# Patient Record
Sex: FEMALE | Race: WHITE | URBAN - METROPOLITAN AREA
[De-identification: names, ages, dates, MRNs, and addresses within clinical notes are randomized per-mention and may not be internally consistent; named-entity substitution may affect disease eponyms.]

---

## 2017-02-08 ENCOUNTER — ANESTHESIA EVENT (OUTPATIENT)
Dept: SURGERY | Facility: CLINIC | Age: 42
End: 2017-02-08
Payer: COMMERCIAL

## 2017-02-08 ENCOUNTER — APPOINTMENT (OUTPATIENT)
Dept: CT IMAGING | Facility: CLINIC | Age: 42
End: 2017-02-08
Attending: EMERGENCY MEDICINE
Payer: COMMERCIAL

## 2017-02-08 ENCOUNTER — ANESTHESIA (OUTPATIENT)
Dept: SURGERY | Facility: CLINIC | Age: 42
End: 2017-02-08
Payer: COMMERCIAL

## 2017-02-08 ENCOUNTER — HOSPITAL ENCOUNTER (OUTPATIENT)
Facility: CLINIC | Age: 42
Discharge: HOME | End: 2017-02-09
Attending: EMERGENCY MEDICINE | Admitting: SURGERY
Payer: COMMERCIAL

## 2017-02-08 DIAGNOSIS — K35.891 ACUTE GANGRENOUS APPENDICITIS: Primary | ICD-10-CM

## 2017-02-08 DIAGNOSIS — K35.30 ACUTE APPENDICITIS WITH LOCALIZED PERITONITIS: ICD-10-CM

## 2017-02-08 LAB
ALBUMIN SERPL-MCNC: 3.8 G/DL (ref 3.4–5)
ALBUMIN UR-MCNC: NEGATIVE MG/DL
ALP SERPL-CCNC: 63 U/L (ref 40–150)
ALT SERPL W P-5'-P-CCNC: 16 U/L (ref 0–50)
ANION GAP SERPL CALCULATED.3IONS-SCNC: 9 MMOL/L (ref 3–14)
APPEARANCE UR: CLEAR
AST SERPL W P-5'-P-CCNC: 13 U/L (ref 0–45)
BASOPHILS # BLD AUTO: 0 10E9/L (ref 0–0.2)
BASOPHILS NFR BLD AUTO: 0.1 %
BILIRUB SERPL-MCNC: 2.1 MG/DL (ref 0.2–1.3)
BILIRUB UR QL STRIP: NEGATIVE
BUN SERPL-MCNC: 12 MG/DL (ref 7–30)
CALCIUM SERPL-MCNC: 8.7 MG/DL (ref 8.5–10.1)
CHLORIDE SERPL-SCNC: 105 MMOL/L (ref 94–109)
CO2 SERPL-SCNC: 26 MMOL/L (ref 20–32)
COLOR UR AUTO: YELLOW
CREAT SERPL-MCNC: 0.63 MG/DL (ref 0.52–1.04)
DIFFERENTIAL METHOD BLD: ABNORMAL
EOSINOPHIL # BLD AUTO: 0 10E9/L (ref 0–0.7)
EOSINOPHIL NFR BLD AUTO: 0 %
ERYTHROCYTE [DISTWIDTH] IN BLOOD BY AUTOMATED COUNT: 12.5 % (ref 10–15)
GFR SERPL CREATININE-BSD FRML MDRD: ABNORMAL ML/MIN/1.7M2
GLUCOSE SERPL-MCNC: 97 MG/DL (ref 70–99)
GLUCOSE UR STRIP-MCNC: NEGATIVE MG/DL
HCG SERPL QL: NEGATIVE
HCT VFR BLD AUTO: 43.2 % (ref 35–47)
HGB BLD-MCNC: 14 G/DL (ref 11.7–15.7)
HGB UR QL STRIP: ABNORMAL
IMM GRANULOCYTES # BLD: 0.1 10E9/L (ref 0–0.4)
IMM GRANULOCYTES NFR BLD: 0.5 %
KETONES UR STRIP-MCNC: 80 MG/DL
LEUKOCYTE ESTERASE UR QL STRIP: NEGATIVE
LYMPHOCYTES # BLD AUTO: 0.8 10E9/L (ref 0.8–5.3)
LYMPHOCYTES NFR BLD AUTO: 5.4 %
MCH RBC QN AUTO: 28.6 PG (ref 26.5–33)
MCHC RBC AUTO-ENTMCNC: 32.4 G/DL (ref 31.5–36.5)
MCV RBC AUTO: 88 FL (ref 78–100)
MONOCYTES # BLD AUTO: 0.8 10E9/L (ref 0–1.3)
MONOCYTES NFR BLD AUTO: 5.3 %
MUCOUS THREADS #/AREA URNS LPF: PRESENT /LPF
NEUTROPHILS # BLD AUTO: 13.4 10E9/L (ref 1.6–8.3)
NEUTROPHILS NFR BLD AUTO: 88.7 %
NITRATE UR QL: NEGATIVE
NRBC # BLD AUTO: 0 10*3/UL
NRBC BLD AUTO-RTO: 0 /100
PH UR STRIP: 7 PH (ref 5–7)
PLATELET # BLD AUTO: 174 10E9/L (ref 150–450)
POTASSIUM SERPL-SCNC: 3.8 MMOL/L (ref 3.4–5.3)
PROT SERPL-MCNC: 7.6 G/DL (ref 6.8–8.8)
RBC # BLD AUTO: 4.9 10E12/L (ref 3.8–5.2)
RBC #/AREA URNS AUTO: >182 /HPF (ref 0–2)
SODIUM SERPL-SCNC: 140 MMOL/L (ref 133–144)
SP GR UR STRIP: 1.05 (ref 1–1.03)
SQUAMOUS #/AREA URNS AUTO: 1 /HPF (ref 0–1)
URN SPEC COLLECT METH UR: ABNORMAL
UROBILINOGEN UR STRIP-MCNC: NORMAL MG/DL (ref 0–2)
WBC # BLD AUTO: 15.1 10E9/L (ref 4–11)
WBC #/AREA URNS AUTO: 0 /HPF (ref 0–2)

## 2017-02-08 PROCEDURE — 96361 HYDRATE IV INFUSION ADD-ON: CPT

## 2017-02-08 PROCEDURE — 44970 LAPAROSCOPY APPENDECTOMY: CPT | Performed by: SURGERY

## 2017-02-08 PROCEDURE — 37000009 ZZH ANESTHESIA TECHNICAL FEE, EACH ADDTL 15 MIN: Performed by: SURGERY

## 2017-02-08 PROCEDURE — 99204 OFFICE O/P NEW MOD 45 MIN: CPT | Mod: 57 | Performed by: SURGERY

## 2017-02-08 PROCEDURE — 25000125 ZZHC RX 250: Performed by: NURSE ANESTHETIST, CERTIFIED REGISTERED

## 2017-02-08 PROCEDURE — 25500064 ZZH RX 255 OP 636: Performed by: EMERGENCY MEDICINE

## 2017-02-08 PROCEDURE — 71000013 ZZH RECOVERY PHASE 1 LEVEL 1 EA ADDTL HR: Performed by: SURGERY

## 2017-02-08 PROCEDURE — 84703 CHORIONIC GONADOTROPIN ASSAY: CPT | Performed by: EMERGENCY MEDICINE

## 2017-02-08 PROCEDURE — 25000128 H RX IP 250 OP 636: Performed by: EMERGENCY MEDICINE

## 2017-02-08 PROCEDURE — 81001 URINALYSIS AUTO W/SCOPE: CPT | Performed by: EMERGENCY MEDICINE

## 2017-02-08 PROCEDURE — 25000566 ZZH SEVOFLURANE, EA 15 MIN: Performed by: SURGERY

## 2017-02-08 PROCEDURE — 96376 TX/PRO/DX INJ SAME DRUG ADON: CPT

## 2017-02-08 PROCEDURE — 71000012 ZZH RECOVERY PHASE 1 LEVEL 1 FIRST HR: Performed by: SURGERY

## 2017-02-08 PROCEDURE — 80053 COMPREHEN METABOLIC PANEL: CPT | Performed by: EMERGENCY MEDICINE

## 2017-02-08 PROCEDURE — 25800025 ZZH RX 258: Performed by: ANESTHESIOLOGY

## 2017-02-08 PROCEDURE — 88304 TISSUE EXAM BY PATHOLOGIST: CPT | Performed by: SURGERY

## 2017-02-08 PROCEDURE — 25000125 ZZHC RX 250: Performed by: ANESTHESIOLOGY

## 2017-02-08 PROCEDURE — 25000128 H RX IP 250 OP 636: Performed by: NURSE ANESTHETIST, CERTIFIED REGISTERED

## 2017-02-08 PROCEDURE — 40000306 ZZH STATISTIC PRE PROC ASSESS II: Performed by: SURGERY

## 2017-02-08 PROCEDURE — 37000008 ZZH ANESTHESIA TECHNICAL FEE, 1ST 30 MIN: Performed by: SURGERY

## 2017-02-08 PROCEDURE — 99285 EMERGENCY DEPT VISIT HI MDM: CPT | Mod: 25

## 2017-02-08 PROCEDURE — 27210794 ZZH OR GENERAL SUPPLY STERILE: Performed by: SURGERY

## 2017-02-08 PROCEDURE — 36000058 ZZH SURGERY LEVEL 3 EA 15 ADDTL MIN: Performed by: SURGERY

## 2017-02-08 PROCEDURE — 25800025 ZZH RX 258: Performed by: SURGERY

## 2017-02-08 PROCEDURE — 88304 TISSUE EXAM BY PATHOLOGIST: CPT | Mod: 26 | Performed by: SURGERY

## 2017-02-08 PROCEDURE — 96374 THER/PROPH/DIAG INJ IV PUSH: CPT | Mod: 59

## 2017-02-08 PROCEDURE — 96375 TX/PRO/DX INJ NEW DRUG ADDON: CPT

## 2017-02-08 PROCEDURE — 85025 COMPLETE CBC W/AUTO DIFF WBC: CPT | Performed by: EMERGENCY MEDICINE

## 2017-02-08 PROCEDURE — 36000056 ZZH SURGERY LEVEL 3 1ST 30 MIN: Performed by: SURGERY

## 2017-02-08 PROCEDURE — 74177 CT ABD & PELVIS W/CONTRAST: CPT

## 2017-02-08 PROCEDURE — 25000125 ZZHC RX 250: Performed by: SURGERY

## 2017-02-08 RX ORDER — ONDANSETRON 2 MG/ML
4 INJECTION INTRAMUSCULAR; INTRAVENOUS EVERY 30 MIN PRN
Status: DISCONTINUED | OUTPATIENT
Start: 2017-02-08 | End: 2017-02-09 | Stop reason: HOSPADM

## 2017-02-08 RX ORDER — MEPERIDINE HYDROCHLORIDE 25 MG/ML
12.5 INJECTION INTRAMUSCULAR; INTRAVENOUS; SUBCUTANEOUS
Status: DISCONTINUED | OUTPATIENT
Start: 2017-02-08 | End: 2017-02-09 | Stop reason: HOSPADM

## 2017-02-08 RX ORDER — HYDROMORPHONE HYDROCHLORIDE 1 MG/ML
0.5 INJECTION, SOLUTION INTRAMUSCULAR; INTRAVENOUS; SUBCUTANEOUS
Status: DISCONTINUED | OUTPATIENT
Start: 2017-02-08 | End: 2017-02-09

## 2017-02-08 RX ORDER — ONDANSETRON 2 MG/ML
INJECTION INTRAMUSCULAR; INTRAVENOUS
Status: DISCONTINUED
Start: 2017-02-08 | End: 2017-02-08 | Stop reason: HOSPADM

## 2017-02-08 RX ORDER — MORPHINE SULFATE 4 MG/ML
INJECTION, SOLUTION INTRAMUSCULAR; INTRAVENOUS
Status: DISCONTINUED
Start: 2017-02-08 | End: 2017-02-08 | Stop reason: HOSPADM

## 2017-02-08 RX ORDER — NALOXONE HYDROCHLORIDE 0.4 MG/ML
.1-.4 INJECTION, SOLUTION INTRAMUSCULAR; INTRAVENOUS; SUBCUTANEOUS
Status: DISCONTINUED | OUTPATIENT
Start: 2017-02-08 | End: 2017-02-09 | Stop reason: HOSPADM

## 2017-02-08 RX ORDER — ONDANSETRON 2 MG/ML
INJECTION INTRAMUSCULAR; INTRAVENOUS PRN
Status: DISCONTINUED | OUTPATIENT
Start: 2017-02-08 | End: 2017-02-08

## 2017-02-08 RX ORDER — NEOSTIGMINE METHYLSULFATE 1 MG/ML
VIAL (ML) INJECTION PRN
Status: DISCONTINUED | OUTPATIENT
Start: 2017-02-08 | End: 2017-02-08

## 2017-02-08 RX ORDER — DEXAMETHASONE SODIUM PHOSPHATE 4 MG/ML
INJECTION, SOLUTION INTRA-ARTICULAR; INTRALESIONAL; INTRAMUSCULAR; INTRAVENOUS; SOFT TISSUE PRN
Status: DISCONTINUED | OUTPATIENT
Start: 2017-02-08 | End: 2017-02-08

## 2017-02-08 RX ORDER — GLYCOPYRROLATE 0.2 MG/ML
INJECTION, SOLUTION INTRAMUSCULAR; INTRAVENOUS PRN
Status: DISCONTINUED | OUTPATIENT
Start: 2017-02-08 | End: 2017-02-08

## 2017-02-08 RX ORDER — LIDOCAINE 40 MG/G
CREAM TOPICAL
Status: DISCONTINUED | OUTPATIENT
Start: 2017-02-08 | End: 2017-02-08 | Stop reason: HOSPADM

## 2017-02-08 RX ORDER — FENTANYL CITRATE 50 UG/ML
25-50 INJECTION, SOLUTION INTRAMUSCULAR; INTRAVENOUS
Status: DISCONTINUED | OUTPATIENT
Start: 2017-02-08 | End: 2017-02-09 | Stop reason: HOSPADM

## 2017-02-08 RX ORDER — SODIUM CHLORIDE, SODIUM LACTATE, POTASSIUM CHLORIDE, CALCIUM CHLORIDE 600; 310; 30; 20 MG/100ML; MG/100ML; MG/100ML; MG/100ML
INJECTION, SOLUTION INTRAVENOUS CONTINUOUS
Status: DISCONTINUED | OUTPATIENT
Start: 2017-02-08 | End: 2017-02-08 | Stop reason: HOSPADM

## 2017-02-08 RX ORDER — LIDOCAINE 40 MG/G
CREAM TOPICAL
Status: DISCONTINUED | OUTPATIENT
Start: 2017-02-08 | End: 2017-02-09 | Stop reason: HOSPADM

## 2017-02-08 RX ORDER — MORPHINE SULFATE 4 MG/ML
4 INJECTION, SOLUTION INTRAMUSCULAR; INTRAVENOUS ONCE
Status: COMPLETED | OUTPATIENT
Start: 2017-02-08 | End: 2017-02-08

## 2017-02-08 RX ORDER — FENTANYL CITRATE 50 UG/ML
INJECTION, SOLUTION INTRAMUSCULAR; INTRAVENOUS PRN
Status: DISCONTINUED | OUTPATIENT
Start: 2017-02-08 | End: 2017-02-08

## 2017-02-08 RX ORDER — ONDANSETRON 4 MG/1
4 TABLET, ORALLY DISINTEGRATING ORAL EVERY 30 MIN PRN
Status: DISCONTINUED | OUTPATIENT
Start: 2017-02-08 | End: 2017-02-09 | Stop reason: HOSPADM

## 2017-02-08 RX ORDER — CEFOXITIN 2 G/1
2 INJECTION, POWDER, FOR SOLUTION INTRAVENOUS EVERY 6 HOURS
Status: DISCONTINUED | OUTPATIENT
Start: 2017-02-08 | End: 2017-02-08

## 2017-02-08 RX ORDER — ALBUTEROL SULFATE 0.83 MG/ML
2.5 SOLUTION RESPIRATORY (INHALATION) EVERY 4 HOURS PRN
Status: DISCONTINUED | OUTPATIENT
Start: 2017-02-08 | End: 2017-02-09 | Stop reason: HOSPADM

## 2017-02-08 RX ORDER — PROPOFOL 10 MG/ML
INJECTION, EMULSION INTRAVENOUS PRN
Status: DISCONTINUED | OUTPATIENT
Start: 2017-02-08 | End: 2017-02-08

## 2017-02-08 RX ORDER — ONDANSETRON 2 MG/ML
4 INJECTION INTRAMUSCULAR; INTRAVENOUS ONCE
Status: COMPLETED | OUTPATIENT
Start: 2017-02-08 | End: 2017-02-08

## 2017-02-08 RX ORDER — BUPIVACAINE HYDROCHLORIDE 2.5 MG/ML
INJECTION, SOLUTION EPIDURAL; INFILTRATION; INTRACAUDAL PRN
Status: DISCONTINUED | OUTPATIENT
Start: 2017-02-08 | End: 2017-02-09 | Stop reason: HOSPADM

## 2017-02-08 RX ORDER — HYDROMORPHONE HYDROCHLORIDE 1 MG/ML
.3-.5 INJECTION, SOLUTION INTRAMUSCULAR; INTRAVENOUS; SUBCUTANEOUS EVERY 10 MIN PRN
Status: DISCONTINUED | OUTPATIENT
Start: 2017-02-08 | End: 2017-02-09 | Stop reason: HOSPADM

## 2017-02-08 RX ORDER — PROMETHAZINE HYDROCHLORIDE 25 MG/ML
6.25 INJECTION, SOLUTION INTRAMUSCULAR; INTRAVENOUS
Status: DISCONTINUED | OUTPATIENT
Start: 2017-02-08 | End: 2017-02-09 | Stop reason: HOSPADM

## 2017-02-08 RX ORDER — CEFOXITIN 2 G/1
2 INJECTION, POWDER, FOR SOLUTION INTRAVENOUS ONCE
Status: COMPLETED | OUTPATIENT
Start: 2017-02-08 | End: 2017-02-08

## 2017-02-08 RX ORDER — LIDOCAINE HYDROCHLORIDE 10 MG/ML
INJECTION, SOLUTION INFILTRATION; PERINEURAL PRN
Status: DISCONTINUED | OUTPATIENT
Start: 2017-02-08 | End: 2017-02-08

## 2017-02-08 RX ORDER — IOPAMIDOL 755 MG/ML
500 INJECTION, SOLUTION INTRAVASCULAR ONCE
Status: COMPLETED | OUTPATIENT
Start: 2017-02-08 | End: 2017-02-08

## 2017-02-08 RX ORDER — SODIUM CHLORIDE, SODIUM LACTATE, POTASSIUM CHLORIDE, CALCIUM CHLORIDE 600; 310; 30; 20 MG/100ML; MG/100ML; MG/100ML; MG/100ML
1000 INJECTION, SOLUTION INTRAVENOUS CONTINUOUS
Status: DISCONTINUED | OUTPATIENT
Start: 2017-02-08 | End: 2017-02-09 | Stop reason: HOSPADM

## 2017-02-08 RX ORDER — HYDROCODONE BITARTRATE AND ACETAMINOPHEN 5; 325 MG/1; MG/1
1-2 TABLET ORAL EVERY 4 HOURS PRN
Qty: 30 TABLET | Refills: 0 | Status: SHIPPED | OUTPATIENT
Start: 2017-02-08 | End: 2017-06-21

## 2017-02-08 RX ORDER — SODIUM CHLORIDE, SODIUM LACTATE, POTASSIUM CHLORIDE, CALCIUM CHLORIDE 600; 310; 30; 20 MG/100ML; MG/100ML; MG/100ML; MG/100ML
INJECTION, SOLUTION INTRAVENOUS CONTINUOUS
Status: DISCONTINUED | OUTPATIENT
Start: 2017-02-08 | End: 2017-02-09

## 2017-02-08 RX ADMIN — SODIUM CHLORIDE, POTASSIUM CHLORIDE, SODIUM LACTATE AND CALCIUM CHLORIDE: 600; 310; 30; 20 INJECTION, SOLUTION INTRAVENOUS at 22:28

## 2017-02-08 RX ADMIN — FENTANYL CITRATE 50 MCG: 50 INJECTION, SOLUTION INTRAMUSCULAR; INTRAVENOUS at 22:43

## 2017-02-08 RX ADMIN — FENTANYL CITRATE 50 MCG: 50 INJECTION, SOLUTION INTRAMUSCULAR; INTRAVENOUS at 22:58

## 2017-02-08 RX ADMIN — HYDROMORPHONE HYDROCHLORIDE 0.5 MG: 10 INJECTION, SOLUTION INTRAMUSCULAR; INTRAVENOUS; SUBCUTANEOUS at 19:53

## 2017-02-08 RX ADMIN — DEXAMETHASONE SODIUM PHOSPHATE 4 MG: 4 INJECTION, SOLUTION INTRAMUSCULAR; INTRAVENOUS at 22:43

## 2017-02-08 RX ADMIN — FENTANYL CITRATE 50 MCG: 50 INJECTION INTRAMUSCULAR; INTRAVENOUS at 23:49

## 2017-02-08 RX ADMIN — GLYCOPYRROLATE 0.2 MG: 0.2 INJECTION, SOLUTION INTRAMUSCULAR; INTRAVENOUS at 22:43

## 2017-02-08 RX ADMIN — SODIUM CHLORIDE 1000 ML: 9 INJECTION, SOLUTION INTRAVENOUS at 16:18

## 2017-02-08 RX ADMIN — Medication 3 MG: at 23:13

## 2017-02-08 RX ADMIN — MIDAZOLAM HYDROCHLORIDE 1 MG: 1 INJECTION, SOLUTION INTRAMUSCULAR; INTRAVENOUS at 22:49

## 2017-02-08 RX ADMIN — SODIUM CHLORIDE 63 ML: 9 INJECTION, SOLUTION INTRAVENOUS at 16:41

## 2017-02-08 RX ADMIN — FENTANYL CITRATE 100 MCG: 50 INJECTION, SOLUTION INTRAMUSCULAR; INTRAVENOUS at 22:41

## 2017-02-08 RX ADMIN — MORPHINE SULFATE 4 MG: 4 INJECTION, SOLUTION INTRAMUSCULAR; INTRAVENOUS at 16:23

## 2017-02-08 RX ADMIN — PROPOFOL 150 MG: 10 INJECTION, EMULSION INTRAVENOUS at 22:43

## 2017-02-08 RX ADMIN — MIDAZOLAM HYDROCHLORIDE 1 MG: 1 INJECTION, SOLUTION INTRAMUSCULAR; INTRAVENOUS at 22:37

## 2017-02-08 RX ADMIN — ONDANSETRON 4 MG: 2 INJECTION INTRAMUSCULAR; INTRAVENOUS at 22:58

## 2017-02-08 RX ADMIN — ROCURONIUM BROMIDE 30 MG: 10 INJECTION INTRAVENOUS at 22:43

## 2017-02-08 RX ADMIN — SODIUM CHLORIDE, POTASSIUM CHLORIDE, SODIUM LACTATE AND CALCIUM CHLORIDE: 600; 310; 30; 20 INJECTION, SOLUTION INTRAVENOUS at 23:13

## 2017-02-08 RX ADMIN — CEFOXITIN SODIUM 2 G: 2 POWDER, FOR SOLUTION INTRAVENOUS at 18:04

## 2017-02-08 RX ADMIN — LIDOCAINE HYDROCHLORIDE 30 MG: 10 INJECTION, SOLUTION INFILTRATION; PERINEURAL at 22:43

## 2017-02-08 RX ADMIN — GLYCOPYRROLATE 0.4 MG: 0.2 INJECTION, SOLUTION INTRAMUSCULAR; INTRAVENOUS at 23:13

## 2017-02-08 RX ADMIN — ONDANSETRON 4 MG: 2 INJECTION INTRAMUSCULAR; INTRAVENOUS at 16:23

## 2017-02-08 RX ADMIN — HYDROMORPHONE HYDROCHLORIDE 0.5 MG: 10 INJECTION, SOLUTION INTRAMUSCULAR; INTRAVENOUS; SUBCUTANEOUS at 17:26

## 2017-02-08 RX ADMIN — IOPAMIDOL 93 ML: 755 INJECTION, SOLUTION INTRAVENOUS at 16:41

## 2017-02-08 ASSESSMENT — ENCOUNTER SYMPTOMS
HEMATURIA: 0
ABDOMINAL PAIN: 1
DIARRHEA: 1
VOMITING: 1
DYSURIA: 0
NAUSEA: 1

## 2017-02-08 ASSESSMENT — LIFESTYLE VARIABLES: TOBACCO_USE: 1

## 2017-02-08 NOTE — IP AVS SNAPSHOT
MRN:[id number]                      After Visit Summary   2/8/2017    Christianne Ruiz    MRN: [id number]           Thank you!     Thank you for choosing [facility] for your care. Our goal is always to provide you with excellent care. Hearing back from our patients is one way we can continue to improve our services. Please take a few minutes to complete the written survey that you may receive in the mail after you visit. If you would like to speak to someone directly about your visit please contact Patient Relations at [phone]. Thank you!          Patient Information     Date Of Birth          1975        About your hospital stay     You were admitted on:  February 9, 2017 You last received care in the:  [facility]    You were discharged on:  February 9, 2017       Who to Call     For medical emergencies, please call 911.  For non-urgent questions about your medical care, please call your primary care provider or clinic, [phone]  For questions related to your surgery, please call your surgery clinic        Attending Provider     Provider    Armen Donaldson MD Gavin, Nini Mahmood MD       Primary Care Provider Office Phone # Fax #    [place] Owen Black [phone]       [facility] [place] [address] [place] [address]        After Care Instructions     Dressing       Keep dressing clean and dry.  Dressing / incisional care as instructed by RN and or MD            Ice to affected area       Ice to operative site PRN                  Further instructions from your care team       HOME CARE FOLLOWING APPENDECTOMY  NATHAN Yu, YOLA Mariscal, YOLA Pacheco L. Thomas    INCISIONAL CARE:  Replace the bandage over your incision (or incisions) until all drainage stops, or if more comfortable to have in place.  If present, leave the steri-strips (white paper tapes) in place till  they fall off.  If you have staples in your incision at the time of discharge, they will be removed at your follow-up appointment.  If Dermabond (a type of skin glue) is present, leave in place until it wears/flakes off.     BATHING:  Avoid baths for 1 week after surgery.  Showers are okay.  You may wash your hair at any time.  Gently pat your incision dry after bathing.    ACTIVITY:  Light Activity -- you may immediately be up and about as tolerated.  Driving -- you may drive when comfortable and off narcotic pain medications.  Light Work -- resume when comfortable off pain medications.  (If you can drive, you probably can work.)  Strenuous Work/Activity -- limit lifting to 20 pounds for 1 week.  Progressively increase with time.  Active Sports (running, biking, etc.) -- cautiously resume after 2 weeks.    DISCOMFORT:  Use pain medications as prescribed by your surgeon.  Take the pain medication with some food, when possible, to minimize side effects.  Intermittent use of ice packs at the incision sites may help during the first 48 hours.  Expect gradual improvement.    DIET:  No restrictions.  Drink plenty of fluids.  While taking pain medications, increase dietary fiber or add a fiber supplementation like Metamucil or Citrucel to help prevent constipation - a possible side effect of pain medications.    NAUSEA:  If nauseated from the anesthetic/pain meds; rest in bed, get up cautiously with assistance, and drink clear liquids (juice, tea, broth).    RETURN APPOINTMENT:  Schedule a follow-up visit 1-3 weeks post-op.  Office Phone:  [phone]     CONTACT US IF THE FOLLOWING DEVELOPS:   1. A fever that is above 101     2. If there is a large amount of drainage, bleeding, or swelling.   3. Severe pain that is not relieved by your prescription.   4. Drainage that is thick, cloudy, yellow, green or white.   5. Any other questions not answered by  Frequently Asked Questions  sheet.      FREQUENTLY ASKED  QUESTIONS:    Q:  How should my incision look?    A:  Normally your incision will appear slightly swollen with light redness directly along the incision itself as it heals.  It may feel like a bump or ridge as the healing/scarring happens, and over time (3-4 months) this bump or ridge feeling should slowly go away.  In general, clear or pink watery drainage can be normal at first as your incision heals, but should decrease over time.    Q:  How do I know if my incision is infected?  A:  Look at your incision for signs of infection, like redness around the incision spreading to surrounding skin, or drainage of cloudy or foul-smelling drainage.  If you feel warm, check your temperature to see if you are running a fever.    **If any of these things occur, please notify the nurse at our office.  We may need you to come into the office for an incision check.      Q:  How do I take care of my incision?  A:  If you have a dressing in place - Starting the day after surgery, replace the dressing 1-2 times a day until there is no further drainage from the incision.  At that time, a dressing is no longer needed.  Try to minimize tape on the skin if irritation is occurring at the tape sites.  If you have significant irritation from tape on the skin, please call the office to discuss other method of dressing your incision.    Small pieces of tape called  steri-strips  may be present directly overlying your incision; these may be removed 10 days after surgery unless otherwise specified by your surgeon.  If these tapes start to loosen at the ends, you may trim them back until they fall off or are removed.    A:  If you had  Dermabond  tissue glue used as a dressing (this causes your incision to look shiny with a clear covering over it) - This type of dressing wears off with time and does not require more dressings over the top unless it is draining around the glue as it wears off.  Do not apply ointments or lotions over the  incisions until the glue has completely worn off.    Q:  There is a piece of tape or a sticky  lead  still on my skin.  Can I remove this?  A:  Sometimes the sticky  leads  used for monitoring during surgery or for evaluation in the emergency department are not all removed while you are in the hospital.  These sometimes have a tab or metal dot on them.  You can easily remove these on your own, like taking off a band-aid.  If there is a gel substance under the  lead , simply wipe/clean it off with a washcloth or paper towel.      Q:  What can I do to minimize constipation (very hard stools, or lack of stools)?  A:  Stay well hydrated.  Increase your dietary fiber intake or take a fiber supplement -with plenty of water.  Walk around frequently.  You may consider an over-the-counter stool-softener.  Your Pharmacist can assist you with choosing one that is stocked at your pharmacy.  Constipation is also one of the most common side effects of pain medication.  If you are using pain medication, be pro-active and try to PREVENT problems with constipation by taking the steps above BEFORE constipation becomes a problem.    Q:  What do I do if I need more pain medications?  A:  Call the office to receive refills.  Be aware that certain pain meds cannot be called into a pharmacy and actually require a paper prescription.  A change may be made in your pain med as you progress thru your recovery period or if you have side effects to certain meds.    --Pain meds are NOT refilled after 5pm on weekdays, and NOT AT ALL on the weekends, so please look ahead to prevent problems.      Q:  Why am I having a hard time sleeping now that I am at home?  A:  Many medications you receive while you are in the hospital can impact your sleep for a number of days after your surgery/hospitalization.  Decreased level of activity and naps during the day may also make sleeping at night difficult.  Try to minimize day-time naps, and get up frequently  during the day to walk around your home during your recovery time.  Sleep aides may be of some help, but are not recommended for long-term use.      Q:  I am having some back discomfort.  What should I do?  A:  This may be related to certain positioning that was required for your surgery, extended periods of time in bed, or other changes in your overall activity level.  You may try ice, heat, acetaminophen, or ibuprofen to treat this temporarily.  Note that many pain medications have acetaminophen in them and would state this on the prescription bottle.  Be sure not to exceed the maximum of 4000mg per day of acetaminophen.     **If the pain you are having does not resolve, is severe, or is a flare of back pain you have had on other occasions prior to surgery, please contact your primary physician for further recommendations or for an appointment to be examined at their office.    Q:  Why am I having headaches?  A:  Headaches can be caused by many things:  caffeine withdrawal, use of pain meds, dehydration, high blood pressure, lack of sleep, over-activity/exhaustion, flare-up of usual migraine headaches.  If you feel this is related to muscle tension (a band-like feeling around the head, or a pressure at the low-back of the head) you may try ice or heat to this area.  You may need to drink more fluids (try electrolyte drink like Gatorade), rest, or take your usual migraine medications.   **If your headaches do not resolve, worsen, are accompanied by other symptoms, or if your blood pressure is high, please call your primary physician for recommendation and/or examination.    Q:  I am unable to urinate.  What do I do?  A:  A small percentage of people can have difficulty urinating initially after surgery.  This includes being able to urinate only a very small amount at a time and feeling discomfort or pressure in the very low abdomen.  This is called  urinary retention , and is actually an urgent situation.  Proceed  "to your nearest Emergency department for evaluation (not an Urgent Care Center).  Sometimes the bladder does not work correctly after certain medications you receive during surgery, or related to certain procedures.  You may need to have a catheter placed until your bladder recovers.  When planning to go to an Emergency department, it may help to call the ER to let them know you are coming in for this problem after a surgery.  This may help you get in quicker to be evaluated.  **If you have symptoms of a urinary tract infection, please contact your primary physician for the proper evaluation and treatment.          If you have other questions, please call the office Monday thru Friday between 8am and 5pm to discuss with the nurse or physician assistant.  #[phone]    There is a surgeon ON CALL on weekday evenings and over the weekend in case of urgent need only, and may be contacted at the same number.    If you are having an emergency, call 911 or proceed to your nearest emergency department.            Pending Results     Date and Time Order Name Status Description    2/8/2017 2304 Surgical pathology exam In process             Statement of Approval     Ordered          02/09/17 0918  I have reviewed and agree with all the recommendations and orders detailed in this document.   EFFECTIVE NOW     Approved and electronically signed by:  Martine Munoz PA-C             Admission Information        Provider Department Dept Phone    2/8/2017 Nini Rodriguez MD  Pediatrics [phone]      Your Vitals Were     Blood Pressure Temperature Respirations    100/60 mmHg 98  F (36.7  C) (Oral) 16    Height Weight BMI (Body Mass Index)    1.753 m (5' 9\") 86.183 kg (190 lb) 28.05 kg/m2    Pulse Oximetry Last Period       98% 02/04/2017       MyChart Information     [facility] lets you send messages to your doctor, view your test results, renew your prescriptions, schedule appointments and more. To sign up, go to " "[email or web address] . Click on \"Log in\" on the left side of the screen, which will take you to the Welcome page. Then click on \"Sign up Now\" on the right side of the page.     You will be asked to enter the access code listed below, as well as some personal information. Please follow the directions to create your username and password.     Your access code is: [email or web address]  Expires: 5/10/2017 10:28 AM     Your access code will  in 90 days. If you need help or a new code, please call your [place] clinic or [phone].        Care EveryWhere ID     This is your Care EveryWhere ID. This could be used by other organizations to access your [place] medical records  [id number]           Review of your medicines      START taking        Dose / Directions    amoxicillin-clavulanate 875-125 MG per tablet   Commonly known as:  AUGMENTIN   Used for:  Acute gangrenous appendicitis   Notes to Patient:  Start tomorrow.        Dose:  1 tablet   Take 1 tablet by mouth 2 times daily for 7 days   Quantity:  14 tablet   Refills:  0       HYDROcodone-acetaminophen 5-325 MG per tablet   Commonly known as:  NORCO   Used for:  Acute gangrenous appendicitis   Notes to Patient:  Take this medication when ibuprofen is not adequately controlling your pain.  Do not take any medication containing acetaminophen within 4 hours of taking this medication.        Dose:  1-2 tablet   Take 1-2 tablets by mouth every 4 hours as needed for other (Moderate to Severe Pain)   Quantity:  30 tablet   Refills:  0       senna-docusate 8.6-50 MG per tablet   Commonly known as:  SENOKOT-S;PERICOLACE   Used for:  Acute gangrenous appendicitis   Notes to Patient:  Start this today.        Dose:  1-2 tablet   Take 1-2 tablets by mouth 2 times daily Take while on oral narcotics to prevent or treat constipation.   Quantity:  30 tablet   Refills:  0         CONTINUE these medicines which may have CHANGED, or have new prescriptions. If we are uncertain " of the size of tablets/capsules you have at home, strength may be listed as something that might have changed.        Dose / Directions    * ibuprofen 800 MG tablet   Commonly known as:  ADVIL/MOTRIN   This may have changed:  Another medication with the same name was added. Make sure you understand how and when to take each.   Used for:  Influenza A   Notes to Patient:  Ignore this medication.  You have been prescribed 600mg        Dose:  800 mg   Take 1 tablet (800 mg) by mouth every 8 hours as needed for pain   Quantity:  100 tablet   Refills:  0       * ibuprofen 600 MG tablet   Commonly known as:  ADVIL/MOTRIN   This may have changed:  You were already taking a medication with the same name, and this prescription was added. Make sure you understand how and when to take each.   Used for:  Acute gangrenous appendicitis   Notes to Patient:  Take this medication regularly to manage your pain.          Dose:  600 mg   Take 1 tablet (600 mg) by mouth every 6 hours as needed for pain (mild)   Quantity:  30 tablet   Refills:  0       * Notice:  This list has 2 medication(s) that are the same as other medications prescribed for you. Read the directions carefully, and ask your doctor or other care provider to review them with you.      CONTINUE these medicines which have NOT CHANGED        Dose / Directions    norethindrone 0.35 MG per tablet   Commonly known as:  MICRONOR   Used for:  Contraception        Dose:  1 tablet   Take 1 tablet (0.35 mg) by mouth daily   Quantity:  84 tablet   Refills:  3            Where to get your medicines      These medications were sent to [facility] [place], [place] - [address] [facility] [address]  [address] [facility] [address]     Phone:  [phone]    - amoxicillin-clavulanate 875-125 MG per tablet  - ibuprofen 600 MG tablet  - senna-docusate 8.6-50 MG per tablet      Some of these will need a paper prescription and others can be bought over the counter. Ask  your nurse if you have questions.     Bring a paper prescription for each of these medications    - HYDROcodone-acetaminophen 5-325 MG per tablet             Protect others around you: Learn how to safely use, store and throw away your medicines at www.disposemymeds.org.             Medication List: This is a list of all your medications and when to take them. Check marks below indicate your daily home schedule. Keep this list as a reference.      Medications           Morning Afternoon Evening Bedtime As Needed    amoxicillin-clavulanate 875-125 MG per tablet   Commonly known as:  AUGMENTIN   Take 1 tablet by mouth 2 times daily for 7 days   Notes to Patient:  Start tomorrow.                                HYDROcodone-acetaminophen 5-325 MG per tablet   Commonly known as:  NORCO   Take 1-2 tablets by mouth every 4 hours as needed for other (Moderate to Severe Pain)   Notes to Patient:  Take this medication when ibuprofen is not adequately controlling your pain.  Do not take any medication containing acetaminophen within 4 hours of taking this medication.                                * ibuprofen 800 MG tablet   Commonly known as:  ADVIL/MOTRIN   Take 1 tablet (800 mg) by mouth every 8 hours as needed for pain   Notes to Patient:  Ignore this medication.  You have been prescribed 600mg                                * ibuprofen 600 MG tablet   Commonly known as:  ADVIL/MOTRIN   Take 1 tablet (600 mg) by mouth every 6 hours as needed for pain (mild)   Next Dose Due:  2:30 pm   Notes to Patient:  Take this medication regularly to manage your pain.                                  norethindrone 0.35 MG per tablet   Commonly known as:  MICRONOR   Take 1 tablet (0.35 mg) by mouth daily                                senna-docusate 8.6-50 MG per tablet   Commonly known as:  SENOKOT-S;PERICOLACE   Take 1-2 tablets by mouth 2 times daily Take while on oral narcotics to prevent or treat constipation.   Notes to Patient:   Start this today.                                * Notice:  This list has 2 medication(s) that are the same as other medications prescribed for you. Read the directions carefully, and ask your doctor or other care provider to review them with you.

## 2017-02-08 NOTE — IP AVS SNAPSHOT
[facility]    [address]    [facility] [address]    Phone:  [phone]    Fax:  [phone]                                       After Visit Summary   2/8/2017    Christianne Ruiz    MRN: [id number]           After Visit Summary Signature Page     I have received my discharge instructions, and my questions have been answered. I have discussed any challenges I see with this plan with the nurse or doctor.    ..........................................................................................................................................  Patient/Patient Representative Signature      ..........................................................................................................................................  Patient Representative Print Name and Relationship to Patient    ..................................................               ................................................  Date                                            Time    ..........................................................................................................................................  Reviewed by Signature/Title    ...................................................              ..............................................  Date                                                            Time

## 2017-02-08 NOTE — ED PROVIDER NOTES
History     Chief Complaint:  Abdominal Pain    HPI   Christianne Ruiz is a 41 year old female with a history of severe cervical dysplasia who presents to the emergency department for evaluation of abdominal pain. The patient began having mid to lower abdominal pain last night at 2000, which has been constant and has been worsening since onset. She additionally notes that she has been nauseated today and has been vomiting today and had two episodes of diarrhea since onset as well. Of note, the patient has a history of ovarian cysts and thought this abdominal pain may represent a flare up of this issue, but she feels that her current abdominal pain feels slightly different than the pain she has had in the past with this issue. She took a dose of ibuprofen last night and Tums for her abdominal pain, with little relief. When her abdominal pain continued into this afternoon, the patient decided to seek evaluation here in the emergency department. Of note, the patient is currently on her menstrual period and denies any urinary symptoms.     Allergies:  NKDA     Medications:    Norethindrone     Past Medical History:    LGSIL  Severe cervical dysplasia     Past Surgical History:    Cervical LEEP     Family History:    hypertension  Arthritis  Kidney stones  hyperlipidemia  multiple sclerosis     Social History:  Presents with her .  Current Every day smoker, 0.25 ppd. Last attempted quit date 12/28/2009.  Positive for alcohol use, two drinks weekly.   Marital Status:   [2]    Review of Systems   Gastrointestinal: Positive for nausea, vomiting, abdominal pain and diarrhea.   Genitourinary: Negative for dysuria and hematuria.   All other systems reviewed and are negative.    Physical Exam     Patient Vitals for the past 24 hrs:   BP Temp Temp src Heart Rate Resp SpO2   02/08/17 1716 - - - - - 99 %   02/08/17 1715 - - - - - 99 %   02/08/17 1714 114/72 mmHg - - - - -   02/08/17 1630 - - - - - 98 %   02/08/17  1410 113/82 mmHg 98.3  F (36.8  C) Oral 69 20 98 %       Physical Exam  Nursing note and vitals reviewed.  Constitutional: Cooperative.   HENT:   Mouth/Throat: Moist mucous membranes.   Eyes: EOMI, nonicteric sclera  Cardiovascular: Normal rate, regular rhythm, no murmurs, rubs, or gallops  Pulmonary/Chest: Effort normal and breath sounds normal. No respiratory distress. No wheezes. No rales.   Abdominal: Soft. Focal TTP RLQ. + guarding. No rebound or rigidity. Nondistended. BS present.   Musculoskeletal: Normal range of motion.   Neurological: Alert. Moves all extremities spontaneously.   Skin: Skin is warm and dry. No rash noted.   Psychiatric: Normal mood and affect.       Emergency Department Course   Imaging:  Radiographic findings were communicated with the patient who voiced understanding of the findings.    CT Abdomen/Pelvis with IV contrast:   Acute appendicitis including an appendicolith. There is no  evidence of perforation or an adjacent abscess. As per radiology.    Laboratory:  CBC: WBC: 15.1 (H), HGB: 14.0, PLT: 174  CMP: Bilirubin 2.1 (H), o/w WNL (Creatinine: 0.63)    HCG qualitative (blood): Negative    UA with micro: ordered, not collected    Interventions:  1618 NS 1L IV  1623 Zofran, 4 mg, IV injection   Morphine, 4 mg, IV injection  1711 Zofran, 4 mg, IV injection  1726 Dilaudid, 0.5 mg, IV injection  1804 Cefoxitin 2 g IV    Emergency Department Course:  Nursing notes and vitals reviewed. I performed an exam of the patient as documented above.     IV inserted. Medicine administered as documented above. Blood drawn. This was sent to the lab for further testing, results above.    The patient was sent for a CT Abdomen/Pelvis while in the emergency department, findings above.     1721 I spoke with the patient and discussed the results of her workup thus far in the ED.    1747 I consulted with Dr. Rodriguez, general surgery, regarding the patient's history and presentation here in the emergency  department.    1749 I reevaluated the patient and provided an update in regards to her ED course.      Findings and plan explained to the Patient who consents to admission. Discussed the patient with Dr. Rodriguez, who will admit the patient to a surgical bed for further monitoring, evaluation, and treatment.    Impression & Plan    Medical Decision Making:  Crhistianne Ruiz is a 41 year old female who presents with abdominal pain concerning for appendicitis. CT scan confirms the presence of appendicitis, and there is no evidence of rupture or abscess at this time. The patient s symptoms have been controlled with interventions in the Emergency Department, and she appears more comfortable on recheck. I discussed the diagnosis with the patient and her  and have answered all questions about the plan of care. Parenteral antibiotics have been ordered .  I discussed the patient with the on call general surgeon, Dr. Rodriguez, and the patient will be proceeding to surgery. She has remained hemodynamically stable in the Emergency Department.    Diagnosis:  1. Acute appendicitis with localized peritonitis (K35.3)    Disposition:  Admitted to Dr. Rodriguez    I, Rina Sanchez, am serving as a scribe on 2/8/2017 at 3:37 PM to personally document services performed by Armen Donaldson MD based on my observations and the provider's statements to me.     Rina Sanchez  2/8/2017   [facility] EMERGENCY DEPARTMENT        Armen Donaldson MD  02/10/17 0923

## 2017-02-09 VITALS
TEMPERATURE: 98 F | OXYGEN SATURATION: 98 % | SYSTOLIC BLOOD PRESSURE: 100 MMHG | RESPIRATION RATE: 16 BRPM | BODY MASS INDEX: 28.14 KG/M2 | DIASTOLIC BLOOD PRESSURE: 60 MMHG | WEIGHT: 190 LBS | HEIGHT: 69 IN

## 2017-02-09 PROBLEM — K35.891 GANGRENOUS APPENDICITIS: Status: ACTIVE | Noted: 2017-02-09

## 2017-02-09 LAB — GLUCOSE BLDC GLUCOMTR-MCNC: 124 MG/DL (ref 70–99)

## 2017-02-09 PROCEDURE — 25800025 ZZH RX 258: Performed by: ANESTHESIOLOGY

## 2017-02-09 PROCEDURE — 82962 GLUCOSE BLOOD TEST: CPT

## 2017-02-09 PROCEDURE — 25000128 H RX IP 250 OP 636: Performed by: SURGERY

## 2017-02-09 PROCEDURE — 25000125 ZZHC RX 250: Performed by: ANESTHESIOLOGY

## 2017-02-09 RX ORDER — KETOROLAC TROMETHAMINE 30 MG/ML
30 INJECTION, SOLUTION INTRAMUSCULAR; INTRAVENOUS EVERY 6 HOURS
Status: DISCONTINUED | OUTPATIENT
Start: 2017-02-09 | End: 2017-02-09 | Stop reason: HOSPADM

## 2017-02-09 RX ORDER — IBUPROFEN 600 MG/1
600 TABLET, FILM COATED ORAL EVERY 6 HOURS PRN
Status: DISCONTINUED | OUTPATIENT
Start: 2017-02-10 | End: 2017-02-09 | Stop reason: HOSPADM

## 2017-02-09 RX ORDER — AMOXICILLIN 250 MG
1-2 CAPSULE ORAL 2 TIMES DAILY
Qty: 30 TABLET | Refills: 0 | Status: SHIPPED | OUTPATIENT
Start: 2017-02-09 | End: 2017-06-21

## 2017-02-09 RX ORDER — ONDANSETRON 2 MG/ML
4 INJECTION INTRAMUSCULAR; INTRAVENOUS EVERY 6 HOURS PRN
Status: DISCONTINUED | OUTPATIENT
Start: 2017-02-09 | End: 2017-02-09 | Stop reason: HOSPADM

## 2017-02-09 RX ORDER — PROCHLORPERAZINE MALEATE 5 MG
5-10 TABLET ORAL EVERY 6 HOURS PRN
Status: DISCONTINUED | OUTPATIENT
Start: 2017-02-09 | End: 2017-02-09 | Stop reason: HOSPADM

## 2017-02-09 RX ORDER — IBUPROFEN 600 MG/1
600 TABLET, FILM COATED ORAL EVERY 6 HOURS PRN
Qty: 30 TABLET | Refills: 0 | Status: SHIPPED | OUTPATIENT
Start: 2017-02-09 | End: 2017-06-21

## 2017-02-09 RX ORDER — HYDROMORPHONE HYDROCHLORIDE 1 MG/ML
.3-.5 INJECTION, SOLUTION INTRAMUSCULAR; INTRAVENOUS; SUBCUTANEOUS
Status: DISCONTINUED | OUTPATIENT
Start: 2017-02-09 | End: 2017-02-09 | Stop reason: HOSPADM

## 2017-02-09 RX ORDER — HYDROXYZINE HYDROCHLORIDE 25 MG/1
25 TABLET, FILM COATED ORAL EVERY 6 HOURS PRN
Status: DISCONTINUED | OUTPATIENT
Start: 2017-02-09 | End: 2017-02-09 | Stop reason: HOSPADM

## 2017-02-09 RX ORDER — FENTANYL CITRATE 50 UG/ML
25-50 INJECTION, SOLUTION INTRAMUSCULAR; INTRAVENOUS
Status: DISCONTINUED | OUTPATIENT
Start: 2017-02-09 | End: 2017-02-09

## 2017-02-09 RX ORDER — HYDROCODONE BITARTRATE AND ACETAMINOPHEN 5; 325 MG/1; MG/1
1-2 TABLET ORAL EVERY 4 HOURS PRN
Status: DISCONTINUED | OUTPATIENT
Start: 2017-02-09 | End: 2017-02-09 | Stop reason: HOSPADM

## 2017-02-09 RX ORDER — SODIUM CHLORIDE 9 MG/ML
1000 INJECTION, SOLUTION INTRAVENOUS CONTINUOUS
Status: DISCONTINUED | OUTPATIENT
Start: 2017-02-09 | End: 2017-02-09 | Stop reason: HOSPADM

## 2017-02-09 RX ORDER — NALOXONE HYDROCHLORIDE 0.4 MG/ML
.1-.4 INJECTION, SOLUTION INTRAMUSCULAR; INTRAVENOUS; SUBCUTANEOUS
Status: DISCONTINUED | OUTPATIENT
Start: 2017-02-09 | End: 2017-02-09 | Stop reason: HOSPADM

## 2017-02-09 RX ORDER — ALUMINA, MAGNESIA, AND SIMETHICONE 2400; 2400; 240 MG/30ML; MG/30ML; MG/30ML
15-30 SUSPENSION ORAL EVERY 4 HOURS PRN
Status: DISCONTINUED | OUTPATIENT
Start: 2017-02-09 | End: 2017-02-09 | Stop reason: HOSPADM

## 2017-02-09 RX ORDER — ONDANSETRON 4 MG/1
4 TABLET, ORALLY DISINTEGRATING ORAL EVERY 6 HOURS PRN
Status: DISCONTINUED | OUTPATIENT
Start: 2017-02-09 | End: 2017-02-09 | Stop reason: HOSPADM

## 2017-02-09 RX ORDER — ERTAPENEM 1 G/1
1 INJECTION, POWDER, LYOPHILIZED, FOR SOLUTION INTRAMUSCULAR; INTRAVENOUS EVERY 24 HOURS
Status: DISCONTINUED | OUTPATIENT
Start: 2017-02-09 | End: 2017-02-09 | Stop reason: HOSPADM

## 2017-02-09 RX ADMIN — HYDROMORPHONE HYDROCHLORIDE 0.5 MG: 10 INJECTION, SOLUTION INTRAMUSCULAR; INTRAVENOUS; SUBCUTANEOUS at 01:23

## 2017-02-09 RX ADMIN — FENTANYL CITRATE 50 MCG: 50 INJECTION INTRAMUSCULAR; INTRAVENOUS at 00:39

## 2017-02-09 RX ADMIN — KETOROLAC TROMETHAMINE 30 MG: 30 INJECTION, SOLUTION INTRAMUSCULAR at 01:40

## 2017-02-09 RX ADMIN — KETOROLAC TROMETHAMINE 30 MG: 30 INJECTION, SOLUTION INTRAMUSCULAR at 08:55

## 2017-02-09 RX ADMIN — ERTAPENEM SODIUM 1 G: 1 INJECTION, POWDER, LYOPHILIZED, FOR SOLUTION INTRAMUSCULAR; INTRAVENOUS at 01:41

## 2017-02-09 RX ADMIN — SODIUM CHLORIDE, POTASSIUM CHLORIDE, SODIUM LACTATE AND CALCIUM CHLORIDE 100 ML/HR: 600; 310; 30; 20 INJECTION, SOLUTION INTRAVENOUS at 00:41

## 2017-02-09 NOTE — H&P
[facility]  [facility] - H&P     Christianne WENDY Ruiz MRN# [id number]   Age: 41 year old YOB: 1975     HPI:  The patient has been experiencing acute suprapubic and RLQ abdominal pain for the past 1 day associated with chills, nausea, vomiting and anorexia.  Negative for associated fever and anorexia.     Similar pain in the past:    Yes, ovarian cysts  Diarrhea, now or in the past:   Yes, occ loose stool, one daily  Colonoscopy:   Yes, ~10 ya    History is obtained from the patient.    Review Of Systems:  Respiratory: No shortness of breath, dyspnea on exertion, cough, or hemoptysis  Cardiovascular: negative  Gastrointestinal: as above  Genitourinary: negative  Hematologic: Denies a h/o bleeding or clotting disorders.  Denies a history of problems with anesthesia.  Remainder of 10 point review of systems negative.    PMH:  Past Medical History   Diagnosis Date     Papanicolaou smear of cervix with low grade squamous intraepithelial lesion (LGSIL) (aka LSIL) 5/18/15     History of colposcopy with cervical biopsy 7/9/15     JERONIMO 2       PSH:  Past Surgical History   Procedure Laterality Date     Leep tx, cervical  9/18/15     Leep tx, cervical  11/13/15       Allergies:  Allergies   Allergen Reactions     No Known Allergies        Home Medications:  No current outpatient prescriptions on file.       Social History:  Social History   Substance Use Topics     Smoking status: Current Every Day Smoker -- 0.25 packs/day     Last Attempt to Quit: 12/28/2009     Smokeless tobacco: Never Used     Alcohol Use: No      Comment: two drinks weekly/Pt. stopped when she found out she was pregnant       Family History:  No family history chronic diarrhea, inflammatory bowel disease or colon cancer.  No bleeding disorders, clotting disorders, or problems with anesthesia.    Physical Exam:  /63 mmHg  Temp(Src) 101.5  F (38.6  C) (Temporal)  Resp 16  SpO2 97%  LMP 02/04/2017  General  appearance: Resting Comfortably in bed, no apparent distress  Neck: Supple without thyromegaly, lymphadenopathy, masses  Lungs: Clear to auscultation bilaterally  Heart: regular rate and rhythm, no murmurs, rubs, or gallops  Abdomen: rounded, normal bowel sounds    Tenderness: present: suprapubic and RLQ moderate, positive rebound tenderness   Masses: none   Organomegaly: none  Extremities: Without clubbing, cyanosis, edema  Neurologic: Grossly intact times four extremities, alert and oriented times three  Psychiatric: Mood and affect are appropriate  Skin: Without lesions or rashes      Labs Reviewed:  WBC     15.1   2/8/2017  HGB     14.0   2/8/2017  PLT      174   2/8/2017  Last Basic Metabolic Panel:  NA      140   2/8/2017   POTASSIUM      3.8   2/8/2017  CHLORIDE      105   2/8/2017  YENY      8.7   2/8/2017  CO2       26   2/8/2017  BUN       12   2/8/2017  CR     0.63   2/8/2017  GLC       97   2/8/2017    Radiology:  All imaging studies reviewed by me.    Results for orders placed or performed during the hospital encounter of 02/08/17   CT Abdomen Pelvis w Contrast    Narrative    CT ABDOMEN AND PELVIS WITH CONTRAST  2/8/2017 4:49 PM    HISTORY: Right lower quadrant abdominal pain.    COMPARISON: None.    TECHNIQUE: Routine transverse CT imaging of the abdomen and pelvis was  performed following the uneventful administration of 93 mL Isovue-370  intravenous contrast. Radiation dose for this scan was reduced using  automated exposure control, adjustment of the mA and/or kV according  to patient size, or iterative reconstruction technique.    FINDINGS: The visualized lung bases are clear. The liver, spleen,  pancreas, gallbladder, adrenal glands, kidneys, and bladder are  normal. No enlarged lymph node or other abnormal mass is demonstrated.  There is a trace of free fluid in the pelvis. No free intraperitoneal  gas is identified. The gastrointestinal tract is unremarkable. The  appendix is markedly abnormal.  Internally, there is an appendicolith  measuring 1.6 cm long x 0.9 cm in diameter. The appendix is distended  measuring 1.5 cm in diameter and is filled with fluid. There is no  evidence of perforation or an adjacent abscess. No vascular  abnormality is seen. The osseous structures are unremarkable. No  abdominal or pelvic wall pathology is demonstrated.       Impression    IMPRESSION: Acute appendicitis including an appendicolith. There is no  evidence of perforation or an adjacent abscess.    NABEEL LENZ MD         ASSESSMENT/PLAN:  The patient's history, physical exam, laboratory and imaging studies are suspicious for acute appendicitis.  I have offered the patient a laparoscopic appendectomy.      We have had a detailed discussion of nature of appendicitis, the procedure, its risks, benefits, alternatives, recovery, postop limitations, anesthesia, bleeding, blood transfusion,  DVT, PE, postoperative infections, injury to adjacent organs and structures, open conversion, bowel resection, prolonged convalescence in the event of gangrene or perforation of the appendix, abdominal wall hernia, intraabdominal adhesions which can lead to bowel obstruction.  We have discussed interventions and treatment for these complications.  The patient understands the possibility of a diagnosis other than appendicitis.  All questions have been answered to the best of my ability.        She elects to proceed.      Pre-operative antibiotics have been given.     This note was created using voice recognition software. Undetected word substitutions or other errors may have occurred.     Nini Rodriguez MD    Time spent with the patient, reviewing the EMR, reviewing laboratory and imaging studies, more than 50% of which was counseling and coordinating care:  20 minutes.

## 2017-02-09 NOTE — PROGRESS NOTES
"[facility]   General Surgery Progress Note           Assessment and Plan:   Assessment:   POD#1 s/p Procedure(s):  LAPAROSCOPIC APPENDECTOMY  - Wound Class: IV-Dirty or Infected  afebrile      Plan:   -DC home today  -Rx Mchenry, Senokot, Augmentin  -RTC 1-3 weeks         Interval History:   Feels better than preop, pain controlled with IV meds.  Up to bathroom so far, voiding ok.  + ofe clear liquids. No flatus yet.           Physical Exam:   Blood pressure 100/60, temperature 98  F (36.7  C), temperature source Oral, resp. rate 16, height 1.753 m (5' 9\"), weight 86.183 kg (190 lb), last menstrual period 02/04/2017, SpO2 98 %.    I/O last 3 completed shifts:  In: 1790 [P.O.:270; I.V.:1520]  Out: 0      Abdomen:   soft, non-distended, tenderness noted in the right lower quadrant and hypoactive bowel sounds   Inc(s) - clean, dry, intact              Data:     Recent Labs   Lab Test  02/08/17   1518  01/16/13   0805  10/18/12   0932  06/21/12   1918   01/31/11   0920   HGB  14.0  11.6*  12.2  13.8   < >  12.1   WBC  15.1*   --    --   9.7   --   9.2    < > = values in this interval not displayed.       Martine Munoz PA-C       "

## 2017-02-09 NOTE — ANESTHESIA CARE TRANSFER NOTE
Patient: Christianne Ruiz    Procedure(s):  LAPAROSCOPIC APPENDECTOMY  - Wound Class: IV-Dirty or Infected    Diagnosis: appendix  Diagnosis Additional Information: No value filed.    Anesthesia Type:   No value filed.     Note:  Airway :Face Mask  Patient transferred to:PACU  Comments: vss      Vitals: (Last set prior to Anesthesia Care Transfer)    CRNA VITALS  2/8/2017 2251 - 2/8/2017 2329      2/8/2017             Resp Rate (observed): 12                Electronically Signed By: BAKARI Hernández CRNA  February 8, 2017  11:29 PM

## 2017-02-09 NOTE — ANESTHESIA PREPROCEDURE EVALUATION
PAC NOTE:       ANESTHESIA PRE EVALUATION:  Anesthesia Evaluation     .        ROS/MED HX    ENT/Pulmonary:     (+)tobacco use, Current use , . .    Neurologic:  - neg neurologic ROS     Cardiovascular:  - neg cardiovascular ROS       METS/Exercise Tolerance:     Hematologic:  - neg hematologic  ROS       Musculoskeletal:  - neg musculoskeletal ROS       GI/Hepatic:  - neg GI/hepatic ROS       Renal/Genitourinary:  - ROS Renal section negative       Endo:  - neg endo ROS       Psychiatric:  - neg psychiatric ROS       Infectious Disease:  - neg infectious disease ROS       Malignancy:         Other: Comment: .Lab Test        02/08/17     01/16/13     10/18/12     06/21/12      --          01/31/11                       1518          0805          0932          1918           --           0920          WBC          15.1*         --           --          9.7           --          9.2           HGB          14.0         11.6*        12.2         13.8           < >        12.1          MCV          88            --           --          84            --          88            PLT          174           --           --          170           --          115*           < > = values in this interval not displayed.                  Lab Test        02/08/17 01/31/11                       1518          0920          NA           140           --           POTASSIUM    3.8           --           CHLORIDE     105           --           CO2          26            --           BUN          12           8             CR           0.63         0.66          ANIONGAP     9             --           YENY          8.7           --           GLC          97            --                         Physical Exam  Normal systems: cardiovascular and pulmonary    Airway   Mallampati: II    Dental     Cardiovascular   Rhythm and rate: regular and normal      Pulmonary    breath sounds clear to auscultation             Anesthesia  Plan      History & Physical Review  History and physical reviewed and following examination; no interval change.    ASA Status:  2 .        Plan for General with Intravenous induction.   PONV prophylaxis:  Ondansetron (or other 5HT-3) and Dexamethasone or Solumedrol       Postoperative Care  Postoperative pain management:  IV analgesics, Oral pain medications and Multi-modal analgesia.      Consents  Anesthetic plan, risks, benefits and alternatives discussed with:  Patient or representative..                            .

## 2017-02-09 NOTE — OP NOTE
General Surgery Operative Note      Pre-operative diagnosis: acute appendicitis   Post-operative diagnosis: acute gangrenous appendicitis    Procedure: laparoscopic appendectomy   Surgeon: Nini Rodriguez MD   Assistant(s): NONE   Anesthesia: general    Estimated blood loss:  Complications: 2 cc  none   Specimens:   ID Type Source Tests Collected by Time Destination   A : appendix Tissue Appendix SURGICAL PATHOLOGY EXAM Nini Rodriguez MD 2/8/2017 11:03 PM         DESCRIPTION OF PROCEDURE:  The patient was placed on the table in supine position.  General endotracheal anesthesia was induced and the abdomen was prepped and draped in standard sterile fashion.  An incision was made just above the umbilicus with a blade.  The incision was carried down to the fascia.  Fascia was incised with a blade.  The peritoneum was entered bluntly with a Carmalt clamp.  Two interrupted 0 Vicryl sutures were placed at the extremes of this fascial incision.  The Abimael trocar was introduced and the abdomen was insufflated with CO2.  Two 5 mm trocars were placed in the infraumbilical midline, one two fingerbreadths above the pubic symphysis and one longterm between the pubic symphysis and the umbilicus.  The patient was placed in Trendelenburg and right side up.  The appendix was identified in the right lower quadrant.  It appeared edematous, erythematous, dilated and gangrenous.  The appendix was freed up from the mesoappendix at its base using a [place] dissector.  The base of the appendix was ligated and divided with the Endo-EROS stapler.  The mesoappendix was ligated and divided with a reload of the Endo-EROS stapler.  The appendix was passed into an Endocatch bag and removed through the umbilical trocar site.  The right lower quadrant was inspected for hemostasis.  Hemostasis was assured.  We irrigated with copious amounts of sterile saline and aspirated the effluent.  The 2 infraumbilical trocars were removed under  direct visualization.  There was no bleeding from either of these sites.  The Abimael trocar was removed and the abdomen was evacuated of CO2.  An additional interrupted 0 Vicryl suture was placed in the umbilical trocar site fascia.  Each of the three 0 Vicryl sutures was cinched down and tied.  The skin of all 3 incisions was anesthetized with local anesthetic and closed with interrupted 4-0 Vicryl subcuticular sutures and Steri-Strips.  The patient tolerated the procedure well.  Sponge and instrument counts were correct.    Nini Rodriguez MD

## 2017-02-09 NOTE — DISCHARGE INSTRUCTIONS
HOME CARE FOLLOWING APPENDECTOMY  NATHAN Yu, YOLA Mariscal, ИРИНА Moralez, DYAN Salazar    INCISIONAL CARE:  Replace the bandage over your incision (or incisions) until all drainage stops, or if more comfortable to have in place.  If present, leave the steri-strips (white paper tapes) in place till they fall off.  If you have staples in your incision at the time of discharge, they will be removed at your follow-up appointment.  If Dermabond (a type of skin glue) is present, leave in place until it wears/flakes off.     BATHING:  Avoid baths for 1 week after surgery.  Showers are okay.  You may wash your hair at any time.  Gently pat your incision dry after bathing.    ACTIVITY:  Light Activity -- you may immediately be up and about as tolerated.  Driving -- you may drive when comfortable and off narcotic pain medications.  Light Work -- resume when comfortable off pain medications.  (If you can drive, you probably can work.)  Strenuous Work/Activity -- limit lifting to 20 pounds for 1 week.  Progressively increase with time.  Active Sports (running, biking, etc.) -- cautiously resume after 2 weeks.    DISCOMFORT:  Use pain medications as prescribed by your surgeon.  Take the pain medication with some food, when possible, to minimize side effects.  Intermittent use of ice packs at the incision sites may help during the first 48 hours.  Expect gradual improvement.    DIET:  No restrictions.  Drink plenty of fluids.  While taking pain medications, increase dietary fiber or add a fiber supplementation like Metamucil or Citrucel to help prevent constipation - a possible side effect of pain medications.    NAUSEA:  If nauseated from the anesthetic/pain meds; rest in bed, get up cautiously with assistance, and drink clear liquids (juice, tea, broth).    RETURN APPOINTMENT:  Schedule a follow-up visit 1-3 weeks post-op.  Office Phone:  [phone]     CONTACT US IF THE FOLLOWING  DEVELOPS:   1. A fever that is above 101     2. If there is a large amount of drainage, bleeding, or swelling.   3. Severe pain that is not relieved by your prescription.   4. Drainage that is thick, cloudy, yellow, green or white.   5. Any other questions not answered by  Frequently Asked Questions  sheet.      FREQUENTLY ASKED QUESTIONS:    Q:  How should my incision look?    A:  Normally your incision will appear slightly swollen with light redness directly along the incision itself as it heals.  It may feel like a bump or ridge as the healing/scarring happens, and over time (3-4 months) this bump or ridge feeling should slowly go away.  In general, clear or pink watery drainage can be normal at first as your incision heals, but should decrease over time.    Q:  How do I know if my incision is infected?  A:  Look at your incision for signs of infection, like redness around the incision spreading to surrounding skin, or drainage of cloudy or foul-smelling drainage.  If you feel warm, check your temperature to see if you are running a fever.    **If any of these things occur, please notify the nurse at our office.  We may need you to come into the office for an incision check.      Q:  How do I take care of my incision?  A:  If you have a dressing in place - Starting the day after surgery, replace the dressing 1-2 times a day until there is no further drainage from the incision.  At that time, a dressing is no longer needed.  Try to minimize tape on the skin if irritation is occurring at the tape sites.  If you have significant irritation from tape on the skin, please call the office to discuss other method of dressing your incision.    Small pieces of tape called  steri-strips  may be present directly overlying your incision; these may be removed 10 days after surgery unless otherwise specified by your surgeon.  If these tapes start to loosen at the ends, you may trim them back until they fall off or are removed.     A:  If you had  Dermabond  tissue glue used as a dressing (this causes your incision to look shiny with a clear covering over it) - This type of dressing wears off with time and does not require more dressings over the top unless it is draining around the glue as it wears off.  Do not apply ointments or lotions over the incisions until the glue has completely worn off.    Q:  There is a piece of tape or a sticky  lead  still on my skin.  Can I remove this?  A:  Sometimes the sticky  leads  used for monitoring during surgery or for evaluation in the emergency department are not all removed while you are in the hospital.  These sometimes have a tab or metal dot on them.  You can easily remove these on your own, like taking off a band-aid.  If there is a gel substance under the  lead , simply wipe/clean it off with a washcloth or paper towel.      Q:  What can I do to minimize constipation (very hard stools, or lack of stools)?  A:  Stay well hydrated.  Increase your dietary fiber intake or take a fiber supplement -with plenty of water.  Walk around frequently.  You may consider an over-the-counter stool-softener.  Your Pharmacist can assist you with choosing one that is stocked at your pharmacy.  Constipation is also one of the most common side effects of pain medication.  If you are using pain medication, be pro-active and try to PREVENT problems with constipation by taking the steps above BEFORE constipation becomes a problem.    Q:  What do I do if I need more pain medications?  A:  Call the office to receive refills.  Be aware that certain pain meds cannot be called into a pharmacy and actually require a paper prescription.  A change may be made in your pain med as you progress thru your recovery period or if you have side effects to certain meds.    --Pain meds are NOT refilled after 5pm on weekdays, and NOT AT ALL on the weekends, so please look ahead to prevent problems.      Q:  Why am I having a hard time  sleeping now that I am at home?  A:  Many medications you receive while you are in the hospital can impact your sleep for a number of days after your surgery/hospitalization.  Decreased level of activity and naps during the day may also make sleeping at night difficult.  Try to minimize day-time naps, and get up frequently during the day to walk around your home during your recovery time.  Sleep aides may be of some help, but are not recommended for long-term use.      Q:  I am having some back discomfort.  What should I do?  A:  This may be related to certain positioning that was required for your surgery, extended periods of time in bed, or other changes in your overall activity level.  You may try ice, heat, acetaminophen, or ibuprofen to treat this temporarily.  Note that many pain medications have acetaminophen in them and would state this on the prescription bottle.  Be sure not to exceed the maximum of 4000mg per day of acetaminophen.     **If the pain you are having does not resolve, is severe, or is a flare of back pain you have had on other occasions prior to surgery, please contact your primary physician for further recommendations or for an appointment to be examined at their office.    Q:  Why am I having headaches?  A:  Headaches can be caused by many things:  caffeine withdrawal, use of pain meds, dehydration, high blood pressure, lack of sleep, over-activity/exhaustion, flare-up of usual migraine headaches.  If you feel this is related to muscle tension (a band-like feeling around the head, or a pressure at the low-back of the head) you may try ice or heat to this area.  You may need to drink more fluids (try electrolyte drink like Gatorade), rest, or take your usual migraine medications.   **If your headaches do not resolve, worsen, are accompanied by other symptoms, or if your blood pressure is high, please call your primary physician for recommendation and/or examination.    Q:  I am unable to  urinate.  What do I do?  A:  A small percentage of people can have difficulty urinating initially after surgery.  This includes being able to urinate only a very small amount at a time and feeling discomfort or pressure in the very low abdomen.  This is called  urinary retention , and is actually an urgent situation.  Proceed to your nearest Emergency department for evaluation (not an Urgent Care Center).  Sometimes the bladder does not work correctly after certain medications you receive during surgery, or related to certain procedures.  You may need to have a catheter placed until your bladder recovers.  When planning to go to an Emergency department, it may help to call the ER to let them know you are coming in for this problem after a surgery.  This may help you get in quicker to be evaluated.  **If you have symptoms of a urinary tract infection, please contact your primary physician for the proper evaluation and treatment.          If you have other questions, please call the office Monday thru Friday between 8am and 5pm to discuss with the nurse or physician assistant.  #[phone]    There is a surgeon ON CALL on weekday evenings and over the weekend in case of urgent need only, and may be contacted at the same number.    If you are having an emergency, call 911 or proceed to your nearest emergency department.

## 2017-02-09 NOTE — PHARMACY-ADMISSION MEDICATION HISTORY
PTA list completed by pre-admitting nurse    Prior to Admission medications    Medication Sig Last Dose Taking? Auth Provider   ibuprofen (ADVIL/MOTRIN) 600 MG tablet Take 1 tablet (600 mg) by mouth every 6 hours as needed for pain (mild)  Yes Nini Rodriguez MD   senna-docusate (SENOKOT-S;PERICOLACE) 8.6-50 MG per tablet Take 1-2 tablets by mouth 2 times daily Take while on oral narcotics to prevent or treat constipation.  Yes Nini Rodriguez MD   HYDROcodone-acetaminophen (NORCO) 5-325 MG per tablet Take 1-2 tablets by mouth every 4 hours as needed for other (Moderate to Severe Pain)  Yes Nini Rodriguez MD   norethindrone (MICRONOR) 0.35 MG per tablet Take 1 tablet (0.35 mg) by mouth daily 2/7/2017 at Unknown time Yes Castillo Black MD   ibuprofen (ADVIL,MOTRIN) 800 MG tablet Take 1 tablet (800 mg) by mouth every 8 hours as needed for pain 2/8/2017 Yes Martine Faulkner, PACAREN

## 2017-02-09 NOTE — PLAN OF CARE
Problem: Goal Outcome Summary  Goal: Goal Outcome Summary  Outcome: No Change  R.N.  VSS, afebrile, resting well after dilaudid and toradol, lungs clear, hypoactive bowel sound, dressings intact x 3 with gauze dressings, will continue to monitor closely and provide for needs

## 2017-02-09 NOTE — ANESTHESIA POSTPROCEDURE EVALUATION
Patient: Christianne Ruiz    Procedure(s):  LAPAROSCOPIC APPENDECTOMY  - Wound Class: IV-Dirty or Infected    Diagnosis:appendix  Diagnosis Additional Information: No value filed.    Anesthesia Type:  No value filed.    Note:  Anesthesia Post Evaluation    Patient location during evaluation: PACU  Patient participation: Able to fully participate in evaluation  Level of consciousness: awake  Pain management: adequate  Airway patency: patent  Cardiovascular status: acceptable  Respiratory status: acceptable  Hydration status: acceptable  PONV: controlled     Anesthetic complications: None          Last vitals:  Filed Vitals:    02/08/17 2330 02/08/17 2335 02/08/17 2345   BP: 114/74 115/75 118/74   Temp:      Resp: 15 17 14   SpO2: 100% 100% 95%         Electronically Signed By: Enrico Hitchcock DO  February 8, 2017  11:58 PM

## 2017-02-09 NOTE — PROGRESS NOTES
Vital signs stable, afebrile.  Tolerating full liquid diet.  Pain well controlled with toradol.  Christianne feels she will be able to manage pain with ibuprofen and norco at home.  Ambulating in halls independently.  Voiding without difficulty.  Incisions C/D/I.  Discharge instructions reviewed.  Plan for follow up reviewed.  Discharged to home.

## 2017-02-10 LAB — COPATH REPORT: NORMAL

## 2017-02-13 ENCOUNTER — TELEPHONE (OUTPATIENT)
Dept: SURGERY | Facility: CLINIC | Age: 42
End: 2017-02-13

## 2017-02-13 NOTE — TELEPHONE ENCOUNTER
GENERAL SURGERY NURSE PHONE TRIAGE     Christianne Ruiz      MRN# [id number]  AGE:  41 year old     YOB: 1975  [phone] (home)      Surgeon: Dr. Rodriguez      Surgery type: Laparoscopic Appendectomy       Surgery Date: February / 08 / 2017     POD: 5     CHIEF CONCERN:  Bleeding     HISTORY OF PRESENT ILLNESS:      Patient called to schedule routine po appointment, mentioned bleeding and was transferred  To nurse triage line.    Small amount of dark red drainage under superior umbilical steri-strips.  No redness, swelling or pain.      PLAN:   Patient reassured.  Christianne Ruiz  is recommended to contact the clinic if worsening pain, onset of fever/redness at any incision site, or new drainage from the area. Pt also recommended to call office at any time if ongoing questions/concerns during recovery. Pt is in agreement with this plan.

## 2017-02-15 ENCOUNTER — OFFICE VISIT (OUTPATIENT)
Dept: SURGERY | Facility: CLINIC | Age: 42
End: 2017-02-15
Payer: COMMERCIAL

## 2017-02-15 VITALS
SYSTOLIC BLOOD PRESSURE: 102 MMHG | HEART RATE: 89 BPM | TEMPERATURE: 97.9 F | DIASTOLIC BLOOD PRESSURE: 68 MMHG | HEIGHT: 68 IN | BODY MASS INDEX: 28.04 KG/M2 | OXYGEN SATURATION: 98 % | WEIGHT: 185 LBS

## 2017-02-15 DIAGNOSIS — Z09 SURGICAL FOLLOWUP VISIT: Primary | ICD-10-CM

## 2017-02-15 PROCEDURE — 99024 POSTOP FOLLOW-UP VISIT: CPT | Performed by: PHYSICIAN ASSISTANT

## 2017-02-15 NOTE — MR AVS SNAPSHOT
"              After Visit Summary   2/15/2017    Christianne Ruiz    MRN: [id number]           Patient Information     Date Of Birth          1975        Visit Information        Provider Department      2/15/2017 11:30 AM Martine Munoz PA-C [facility] General Surgery      Today's Diagnoses     Surgical followup visit    -  1       Follow-ups after your visit        Follow-up notes from your care team     Return if symptoms worsen or fail to improve.      Who to contact     If you have questions or need follow up information about today's clinic visit or your schedule please contact [facility] directly at [phone].  Normal or non-critical lab and imaging results will be communicated to you by [employer], letter or phone within 4 business days after the clinic has received the results. If you do not hear from us within 7 days, please contact the clinic through [employer] or phone. If you have a critical or abnormal lab result, we will notify you by phone as soon as possible.  Submit refill requests through [employer] or call your pharmacy and they will forward the refill request to us. Please allow 3 business days for your refill to be completed.          Additional Information About Your Visit        MyChart Information     [employer] lets you send messages to your doctor, view your test results, renew your prescriptions, schedule appointments and more. To sign up, go to [email or web address] . Click on \"Log in\" on the left side of the screen, which will take you to the Welcome page. Then click on \"Sign up Now\" on the right side of the page.     You will be asked to enter the access code listed below, as well as some personal information. Please follow the directions to create your username and password.     Your access code is: [email or web address]  Expires: 5/10/2017 10:28 AM     Your access code will  in 90 days. If you need help or a " "new code, please call your [facility] clinic or [phone].        Care EveryWhere ID     This is your Care EveryWhere ID. This could be used by other organizations to access your [facility] medical records  [id number]        Your Vitals Were     Pulse Temperature Height Last Period Pulse Oximetry BMI (Body Mass Index)    89 97.9  F (36.6  C) (Oral) 1.727 m (5' 8\") 02/04/2017 98% 28.13 kg/m2       Blood Pressure from Last 3 Encounters:   02/15/17 102/68   02/09/17 100/60   06/13/16 116/76    Weight from Last 3 Encounters:   02/15/17 83.9 kg (185 lb)   02/09/17 86.2 kg (190 lb)   06/13/16 84.3 kg (185 lb 12.8 oz)              Today, you had the following     No orders found for display       Primary Care Provider Office Phone # Fax #    Castillo Black [phone]       [place] [address] [place] [address]        Thank you!     Thank you for choosing [facility]  for your care. Our goal is always to provide you with excellent care. Hearing back from our patients is one way we can continue to improve our services. Please take a few minutes to complete the written survey that you may receive in the mail after your visit with us. Thank you!             Your Updated Medication List - Protect others around you: Learn how to safely use, store and throw away your medicines at www.disposemymeds.org.          This list is accurate as of: 2/15/17 11:45 AM.  Always use your most recent med list.                   Brand Name Dispense Instructions for use    amoxicillin-clavulanate 875-125 MG per tablet    AUGMENTIN    14 tablet    Take 1 tablet by mouth 2 times daily for 7 days       HYDROcodone-acetaminophen 5-325 MG per tablet    NORCO    30 tablet    Take 1-2 tablets by mouth every 4 hours as needed for other (Moderate to Severe Pain)       * ibuprofen 800 MG tablet    ADVIL/MOTRIN    100 tablet    Take 1 tablet (800 mg) by mouth every 8 hours as needed " for pain       * ibuprofen 600 MG tablet    ADVIL/MOTRIN    30 tablet    Take 1 tablet (600 mg) by mouth every 6 hours as needed for pain (mild)       norethindrone 0.35 MG per tablet    MICRONOR    84 tablet    Take 1 tablet (0.35 mg) by mouth daily       senna-docusate 8.6-50 MG per tablet    SENOKOT-S;PERICOLACE    30 tablet    Take 1-2 tablets by mouth 2 times daily Take while on oral narcotics to prevent or treat constipation.       * Notice:  This list has 2 medication(s) that are the same as other medications prescribed for you. Read the directions carefully, and ask your doctor or other care provider to review them with you.

## 2017-02-15 NOTE — PROGRESS NOTES
2/15/2017    [facility] Note     Subjective:  Christianne Ruiz is here for her first postoperative visit. She underwent a laparoscopic appendectomy by Dr. Rodriguez on 2/8/2017. Today she  tells me she has been feeling well since surgery. She currently does not require narcotic pain medications, she is eating a normal diet and her bowels are regular. She has no concerns today.    Objective:  Abd - negative  Inc - Healing well, well approximated and without signs of infection    Assessment:  S/p laparoscopic appendectomy. The pathology confirms acute appendicitis.    Plan:  RTC PRN      Martine Munoz PA-C      Please route or send letter to:  *None*

## 2017-03-22 ENCOUNTER — TELEPHONE (OUTPATIENT)
Dept: SURGERY | Facility: CLINIC | Age: 42
End: 2017-03-22

## 2017-03-22 NOTE — TELEPHONE ENCOUNTER
S/p Lap appy,  Gangrenous, non-perforated, 2/8/17.  Surgeon:  Dr. Rodriguez    Patient reports that she has been feeling well since surgery until yesterday when she began with loose, watery stools. Believes she had loose stools around 20 x's yesterday.  Today she has gone less than 10 times and now has not gone in the last 3 hours.  Denies fever/chills or blood in stool.  Eating and drinking ok. Wondering if this is related to her surgery.      Unlikely that loose stools are related to surgery as she is 6 weeks post-op. Can try antidiarrheal such as Immodium. Drink plenty of fluids including Gatorade or beverage with electrolytes.  If experiences s/s of dehydration she should call PCP.   If loose stools do not resolve or if new or worsening symptoms she should call clinic.

## 2017-03-22 NOTE — TELEPHONE ENCOUNTER
Name of caller:Christianne    Reason for Call:  diarrhea for 2 days, concerned it may be relatedd to surgery. Pt states she has gone to the bathroom approx 20x today.  She did notice a couple days before the diarrhea started she had pain/discomfort in the area of her surgery. Please advise.  Surgeon:  Dr. Rodriguez    Recent Surgery:  Yes.    If yes, when & what type: 2/8/17, acute gangrenous appendicitis     Best phone number to reach pt at is: [phone]  Ok to leave a message with medical info? No    Pharmacy preferred (if calling for a refill): NA

## 2017-03-24 NOTE — TELEPHONE ENCOUNTER
Call placed to patient.  Following up on frequent loose stools.    Patient states that she is still having frequent loose stools - unsure of number/day.  Otherwise states that she feels ok.  Denies pain.  Has tried immodium earlier this afternoon and now has not had a loose stool in a number of hours.    She reports that she has appointment to be seen at PCP office this afternoon for diarrhea.     Advised to call clinic if any further questions or concerns.

## 2017-06-21 ENCOUNTER — OFFICE VISIT (OUTPATIENT)
Dept: OBGYN | Facility: CLINIC | Age: 42
End: 2017-06-21
Payer: COMMERCIAL

## 2017-06-21 VITALS
SYSTOLIC BLOOD PRESSURE: 120 MMHG | WEIGHT: 184.6 LBS | HEIGHT: 69 IN | HEART RATE: 60 BPM | DIASTOLIC BLOOD PRESSURE: 84 MMHG | BODY MASS INDEX: 27.34 KG/M2

## 2017-06-21 DIAGNOSIS — Z00.00 ROUTINE HISTORY AND PHYSICAL EXAMINATION OF ADULT: Primary | ICD-10-CM

## 2017-06-21 DIAGNOSIS — R87.612 PAPANICOLAOU SMEAR OF CERVIX WITH LOW GRADE SQUAMOUS INTRAEPITHELIAL LESION (LGSIL): ICD-10-CM

## 2017-06-21 DIAGNOSIS — Z30.41 ENCOUNTER FOR SURVEILLANCE OF CONTRACEPTIVE PILLS: ICD-10-CM

## 2017-06-21 PROCEDURE — 88175 CYTOPATH C/V AUTO FLUID REDO: CPT | Performed by: OBSTETRICS & GYNECOLOGY

## 2017-06-21 PROCEDURE — 99396 PREV VISIT EST AGE 40-64: CPT | Performed by: OBSTETRICS & GYNECOLOGY

## 2017-06-21 PROCEDURE — 87624 HPV HI-RISK TYP POOLED RSLT: CPT | Performed by: OBSTETRICS & GYNECOLOGY

## 2017-06-21 RX ORDER — NORETHINDRONE 0.35 MG/1
TABLET ORAL
Qty: 84 TABLET | Refills: 3 | Status: SHIPPED | OUTPATIENT
Start: 2017-06-21 | End: 2018-05-28

## 2017-06-21 RX ORDER — NORETHINDRONE 0.35 MG/1
TABLET ORAL
Refills: 3 | COMMUNITY
Start: 2017-03-31 | End: 2017-06-21

## 2017-06-21 NOTE — MR AVS SNAPSHOT
"              After Visit Summary   2017    Christianne Ruiz    MRN: [id number]           Patient Information     Date Of Birth          1975        Visit Information        Provider Department      2017 2:45 PM Castillo Black MD [facility]        Today's Diagnoses     Routine history and physical examination of adult    -  1    Papanicolaou smear of cervix with low grade squamous intraepithelial lesion (LGSIL)        Encounter for surveillance of contraceptive pills           Follow-ups after your visit        Who to contact     If you have questions or need follow up information about today's clinic visit or your schedule please contact [facility] directly at [phone].  Normal or non-critical lab and imaging results will be communicated to you by MyChart, letter or phone within 4 business days after the clinic has received the results. If you do not hear from us within 7 days, please contact the clinic through MyChart or phone. If you have a critical or abnormal lab result, we will notify you by phone as soon as possible.  Submit refill requests through [employer] or call your pharmacy and they will forward the refill request to us. Please allow 3 business days for your refill to be completed.          Additional Information About Your Visit        MyChart Information     [employer] lets you send messages to your doctor, view your test results, renew your prescriptions, schedule appointments and more. To sign up, go to [email or web address] . Click on \"Log in\" on the left side of the screen, which will take you to the Welcome page. Then click on \"Sign up Now\" on the right side of the page.     You will be asked to enter the access code listed below, as well as some personal information. Please follow the directions to create your username and password.     Your access code is: [email or web address]  Expires: 2017  3:16 PM     Your access code will  in 90 " "days. If you need help or a new code, please call your [place] clinic or [phone].        Care EveryWhere ID     This is your Care EveryWhere ID. This could be used by other organizations to access your [place] medical records  [id number]        Your Vitals Were     Pulse Height Last Period BMI (Body Mass Index)          60 5' 9\" (1.753 m) 06/13/2017 (Approximate) 27.26 kg/m2         Blood Pressure from Last 3 Encounters:   06/21/17 120/84   02/15/17 102/68   02/09/17 100/60    Weight from Last 3 Encounters:   06/21/17 184 lb 9.6 oz (83.7 kg)   02/15/17 185 lb (83.9 kg)   02/09/17 190 lb (86.2 kg)              Today, you had the following     No orders found for display         Today's Medication Changes          These changes are accurate as of: 6/21/17  3:16 PM.  If you have any questions, ask your nurse or doctor.               These medicines have changed or have updated prescriptions.        Dose/Directions    ERIK-BE 0.35 MG per tablet   This may have changed:  See the new instructions.   Used for:  Encounter for surveillance of contraceptive pills   Generic drug:  norethindrone   Changed by:  Castillo Black MD        One tablet orally, daily.   Quantity:  84 tablet   Refills:  3         Stop taking these medicines if you haven't already. Please contact your care team if you have questions.     HYDROcodone-acetaminophen 5-325 MG per tablet   Commonly known as:  NORCO   Stopped by:  Castillo Black MD           ibuprofen 600 MG tablet   Commonly known as:  ADVIL/MOTRIN   Stopped by:  Castillo Black MD           ibuprofen 800 MG tablet   Commonly known as:  ADVIL/MOTRIN   Stopped by:  Castillo Black MD           senna-docusate 8.6-50 MG per tablet   Commonly known as:  SENOKOT-S;PERICOLACE   Stopped by:  Castillo Black MD                Where to get your medicines      These medications were sent to [facility] [address] [facility] [address] [facility] [address]  [address] [facility] [address]     Phone:  [phone]     ERIK-BE 0.35 MG per tablet                Primary Care Provider Office Phone # Fax #    Castillo Black [phone]       [facility] [address]  [facility] [address]        Equal Access to Services     [facility] Hospital[facility]IL[facility] : Hadii aad ku hadasho Soomaali, waaxda luqadaha, qaybta kaalmada adeegyada, waxay idiin hayaan adeeg kharash la'aan ah. So [facility] [phone].    ATENCIÓN: Si habla español, tiene a madrid disposición servicios gratuitos de asistencia lingüística. Elizabeth al [phone].    We comply with applicable federal civil rights laws and [place] laws. We do not discriminate on the basis of race, color, national origin, age, disability sex, sexual orientation or gender identity.            Thank you!     Thank you for choosing [facility]  for your care. Our goal is always to provide you with excellent care. Hearing back from our patients is one way we can continue to improve our services. Please take a few minutes to complete the written survey that you may receive in the mail after your visit with us. Thank you!             Your Updated Medication List - Protect others around you: Learn how to safely use, store and throw away your medicines at www.disposemymeds.org.          This list is accurate as of: 6/21/17  3:16 PM.  Always use your most recent med list.                   Brand Name Dispense Instructions for use Diagnosis    ERIK-BE 0.35 MG per tablet   Generic drug:  norethindrone     84 tablet    One tablet orally, daily.    Encounter for surveillance of contraceptive pills

## 2017-06-21 NOTE — NURSING NOTE
"Chief Complaint   Patient presents with     Gyn Exam       Initial /84  Pulse 60  Ht 5' 9\" (1.753 m)  Wt 184 lb 9.6 oz (83.7 kg)  LMP 2017 (Approximate)  BMI 27.26 kg/m2 Estimated body mass index is 27.26 kg/(m^2) as calculated from the following:    Height as of this encounter: 5' 9\" (1.753 m).    Weight as of this encounter: 184 lb 9.6 oz (83.7 kg).  BP completed using cuff size: regular        The following HM Due: mammogram  pap smear      The following patient reported/Care Every where data was sent to:  P ABSTRACT QUALITY INITIATIVES [[id number]]  NA     patient has appointment for today    Maya ORTIZ               "

## 2017-06-21 NOTE — PROGRESS NOTES
"SUBJECTIVE:  Christianne Butler is a 42 year old ,  female,  P1 woman who presents for annual exam. Patient's last menstrual period was 2016.   Periods are regular q 28-30 days, lasting 4 days. Dysmenorrhea none. Saturates 1 pad or tampon in 5 hours.    Current contraception: oral contraceptives  History of abnormal Pap smear: no  Regular self breast exam: No  Family history of breast cancer: no. Colon cancer no. Ovarian cancer no.  History of abnormal lipids: no      Past Medical History:   Diagnosis Date     History of colposcopy with cervical biopsy 7/9/15    JERONIMO 2     Papanicolaou smear of cervix with low grade squamous intraepithelial lesion (LGSIL) (aka LSIL) 5/18/15       Past Surgical History:   Procedure Laterality Date     LAPAROSCOPIC APPENDECTOMY N/A 2017    Procedure: LAPAROSCOPIC APPENDECTOMY;  Surgeon: Nini Rodriguez MD;  Location: [facility] OR     LEEP TX, CERVICAL  9/18/15     LEEP TX, CERVICAL  11/13/15       Current Outpatient Prescriptions   Medication     ERIK-BE 0.35 MG per tablet     No current facility-administered medications for this visit.      Allergies   Allergen Reactions     No Known Allergies        Social History   Substance Use Topics     Smoking status: Current Every Day Smoker     Packs/day: 0.25     Last attempt to quit: 2009     Smokeless tobacco: Never Used     Alcohol use No      Comment: two drinks weekly/Pt. stopped when she found out she was pregnant       Review of Systems    CONSTITUTIONAL:NEGATIVE  EYES: NEGATIVE  ENT/MOUTH: NEGATIVE  RESP: NEGATIVE  CV: NEGATIVE  GI: NEGATIVE  : NEGATIVE  MUSCULOSKELATAL: NEGATIVE  INTEGUMENTARY/SKIN: NEGATIVE  BREAST: NEGATIVE  NEURO: NEGATIVE.      OBJECTIVE:  /84  Pulse 60  Ht 5' 9\" (1.753 m)  Wt 184 lb 9.6 oz (83.7 kg)  LMP 2017 (Approximate)  BMI 27.26 kg/m2  General appearance: Healthy.  Skin: Normal.  Mental Status: cooperative, normal affect, no gross thought process defects.  Thyroid: " Normal to palpation, no enlargement or nodules noted.  Breasts: Symmetric without mass tenderness or discharge.  Axillary nodes negative.  Lungs: Clear to auscultation.   Heart.: Normal rate and rhythm.  No murmurs, clicks or gallops.   Abdomen: BS active. Soft, non-tender, no masses or organomegaly.    Pelvis: normal external genitalia, normal groin lymphatics, normal urethral meatus, normal vaginal mucosa, normal cervix, normal adnexa, no masses or tenderness, uterus normal size and shape and uterus antiverted.   Extremities: Normal     ASSESSMENT:  Satisfactory annual gyn exam    PLAN:    1) Pap smear  2) Mammography, lipids at appropriate intervals        PE: reviewed health maintenance including diet, regular exercise and periodic exams.

## 2017-06-21 NOTE — LETTER
June 28, 2017    Christianne Ruiz  [address] [facility] [address]    Dear Christianne,  We are happy to inform you that your PAP smear result from 6/21/17 is normal.  We are now able to do a follow up test on PAP smears. The DNA test is for HPV (Human Papilloma Virus). Cervical cancer is closely linked with certain types of HPV. Your result showed no evidence of HPV.  Therefore we recommend you return in 1 year for your next pap smear.  You will still need to return to the clinic every year for an annual exam and other preventive tests.  Please contact the clinic at [phone] with any questions.  Sincerely,    Castillo Black MD, MD

## 2017-06-23 LAB
COPATH REPORT: NORMAL
PAP: NORMAL

## 2017-06-27 LAB
FINAL DIAGNOSIS: NORMAL
HPV HR 12 DNA CVX QL NAA+PROBE: NEGATIVE
HPV16 DNA SPEC QL NAA+PROBE: NEGATIVE
HPV18 DNA SPEC QL NAA+PROBE: NEGATIVE
SPECIMEN DESCRIPTION: NORMAL

## 2017-06-28 NOTE — PROGRESS NOTES
Pap done on 6/21/17. Please send nl pap and Neg HPV letter, ([id number]) for pap and annual physical in 1 year.   Chart reviewed,  jen.     KAYLA Mustafa RN

## 2018-05-28 DIAGNOSIS — Z30.41 ENCOUNTER FOR SURVEILLANCE OF CONTRACEPTIVE PILLS: ICD-10-CM

## 2018-05-28 NOTE — LETTER
[facility]  [address], [address]  [place] [address]  [phone]        May 29, 2018    Christianne Ruiz  [address] [facility] [address]            Dear MsJose Alberto Christianne NGUYEN Carrie Ruiz:      We recently received a request from your pharmacy requesting a refill of Jahaira-Be.    A review of your chart indicates that an appointment is required.  Please contact our office at [phone] to schedule an office visit.    You will need this appointment for future refills on your medication. Your medication was refilled for 3 month (s).    Taking care of your health is important to us and ongoing visits with your provider are vital to your care.  We look forward to seeing you in the near future.          Sincerely,      Castillo Black M.D.

## 2018-05-29 RX ORDER — NORETHINDRONE 0.35 MG/1
TABLET ORAL
Qty: 84 TABLET | Refills: 0 | Status: SHIPPED | OUTPATIENT
Start: 2018-05-29 | End: 2018-08-22

## 2018-05-29 NOTE — TELEPHONE ENCOUNTER
"Requested Prescriptions   Pending Prescriptions Disp Refills     ERIK-BE 0.35 MG per tablet [Pharmacy Med Name: ERIK-BE 0.35MG TABLETS 28S] 84 tablet 0     Sig: TAKE 1 TABLET BY MOUTH DAILY.ERIK-BE BRAND    Contraceptives Protocol Failed    5/28/2018  9:07 AM       Failed - Patient is not a current smoker if age is 35 or older       Passed - Recent (12 mo) or future (30 days) visit within the authorizing provider's specialty    Patient had office visit in the last 12 months or has a visit in the next 30 days with authorizing provider or within the authorizing provider's specialty.  See \"Patient Info\" tab in inbasket, or \"Choose Columns\" in Meds & Orders section of the refill encounter.           Passed - No active pregnancy on record       Passed - No positive pregnancy test in past 12 months        3 months of rx approved. Patient will be due for annual exam next month. Letter sent.      Natasha Morales RN    "

## 2018-11-18 DIAGNOSIS — Z30.41 ENCOUNTER FOR SURVEILLANCE OF CONTRACEPTIVE PILLS: ICD-10-CM

## 2018-11-18 NOTE — LETTER
[facility]  [address], [address]  [place] [address]  [phone]        November 19, 2018    Christianne Ruiz  [address] [facility] [address]            Dear MsJose Alberto Christianne NGUYEN Carrie Ruiz:      We recently received a request from your pharmacy requesting a refill of your birth control pills.    A review of your chart indicates that an appointment is required.  Please contact our office at [phone] to schedule an office visit.    You will need this appointment for future refills on your medication. Your medication was unable to be refilled at this time as it has been a year and a half since your last visit and our doctors require an appointment every year to review your medications.    Taking care of your health is important to us and ongoing visits with your provider are vital to your care.  We look forward to seeing you in the near future.          Sincerely,      Castillo Black's team

## 2018-11-19 RX ORDER — NORETHINDRONE 0.35 MG/1
TABLET ORAL
Qty: 84 TABLET | Refills: 0 | OUTPATIENT
Start: 2018-11-19

## 2018-11-19 NOTE — TELEPHONE ENCOUNTER
"Requested Prescriptions   Pending Prescriptions Disp Refills     ERIK-BE 0.35 MG per tablet [Pharmacy Med Name: ERIK-BE 0.35MG TABLETS 28S] 84 tablet 0     Sig: TAKE 1 TABLET BY MOUTH EVERY DAY    Contraceptives Protocol Failed    11/18/2018  1:08 PM       Failed - Patient is not a current smoker if age is 35 or older       Failed - Recent (12 mo) or future (30 days) visit within the authorizing provider's specialty    Patient had office visit in the last 12 months or has a visit in the next 30 days with authorizing provider or within the authorizing provider's specialty.  See \"Patient Info\" tab in inbasket, or \"Choose Columns\" in Meds & Orders section of the refill encounter.             Passed - No active pregnancy on record       Passed - No positive pregnancy test in past 12 months        rx denied. Pt has been notified twice (in May and again in August) of the need for an appt before future refills. She will need to schedule an appt before refills can be given. Letter sent.        Natasha Morales RN    "

## 2018-11-30 ENCOUNTER — TELEPHONE (OUTPATIENT)
Dept: OBGYN | Facility: CLINIC | Age: 43
End: 2018-11-30

## 2018-11-30 NOTE — TELEPHONE ENCOUNTER
Pt is past due for f/u pap smear.  LMTC and schedule at [facility].  Beth Billings,    Pap Tracking

## (undated) DEVICE — SUCTION CANISTER STRAW 65652-008

## (undated) DEVICE — SOL NACL 0.9% INJ 1000ML BAG 2B1324X

## (undated) DEVICE — SUCTION CANISTER MEDIVAC LINER 3000ML W/LID 65651-530

## (undated) DEVICE — STPL ENDO RELOAD GIA 45X2MM ROTIC 030453

## (undated) DEVICE — SU VICRYL 0 CT-2 CR 8X18" J727D

## (undated) DEVICE — ESU GROUND PAD ADULT W/CORD E7507

## (undated) DEVICE — GLOVE PROTEXIS POWDER FREE SMT 6.5  2D72PT65X

## (undated) DEVICE — LINEN DRAPE 54X72" 5467

## (undated) DEVICE — SUCTION IRR STRYKERFLOW II W/TIP 250-070-520

## (undated) DEVICE — ESU PENCIL W/HOLSTER E2350H

## (undated) DEVICE — GLOVE PROTEXIS POWDER FREE 8.0 ORTHOPEDIC 2D73ET80

## (undated) DEVICE — GLOVE PROTEXIS POWDER FREE 8.5 ORTHOPEDIC 2D73ET85

## (undated) DEVICE — PREP CHLORAPREP 26ML TINTED ORANGE  260815

## (undated) DEVICE — ENDO POUCH GOLD 10MM ECATCH 173050G

## (undated) DEVICE — BAG CLEAR TRASH 1.3M 39X33" P4040C

## (undated) DEVICE — STPL ENDO GIA 12MM UNIV 030449

## (undated) DEVICE — LINEN POUCH DBL 5427

## (undated) DEVICE — LINEN FULL SHEET 5511

## (undated) DEVICE — ENDO TROCAR BLUNT 100MM W/THRD ANCHOR BLUNTPORT BPT12STS

## (undated) DEVICE — LINEN HALF SHEET 5512

## (undated) DEVICE — GLOVE PROTEXIS BLUE W/NEU-THERA 7.0  2D73EB70

## (undated) DEVICE — ENDO TROCAR 05MM VERSAONE BLADELESS W/STD FIX CAN NONB5STF

## (undated) DEVICE — ESU ELEC BLADE 2.75" COATED/INSULATED E1455

## (undated) DEVICE — STPL ENDO RELOAD 45MM VASCULAR MEDIUM TAN EGIA45AVM

## (undated) DEVICE — GOWN IMPERVIOUS SPECIALTY XLG/XLONG 32474

## (undated) DEVICE — LINEN TOWEL PACK X10 5473

## (undated) DEVICE — ENDO CANNULA 05MM VERSAONE UNIVERSAL UNVCA5STF

## (undated) DEVICE — Device

## (undated) DEVICE — SU VICRYL 4-0 PS-2 18" UND J496H

## (undated) DEVICE — ESU CORD MONOPOLAR 10'  E0510

## (undated) RX ORDER — BUPIVACAINE HYDROCHLORIDE 2.5 MG/ML
INJECTION, SOLUTION EPIDURAL; INFILTRATION; INTRACAUDAL
Status: DISPENSED
Start: 2017-02-08

## (undated) RX ORDER — FENTANYL CITRATE 50 UG/ML
INJECTION, SOLUTION INTRAMUSCULAR; INTRAVENOUS
Status: DISPENSED
Start: 2017-02-08